# Patient Record
Sex: FEMALE | Race: WHITE | NOT HISPANIC OR LATINO
[De-identification: names, ages, dates, MRNs, and addresses within clinical notes are randomized per-mention and may not be internally consistent; named-entity substitution may affect disease eponyms.]

---

## 2017-08-10 ENCOUNTER — APPOINTMENT (OUTPATIENT)
Dept: CARDIOLOGY | Facility: CLINIC | Age: 77
End: 2017-08-10
Payer: MEDICARE

## 2017-08-10 PROCEDURE — 93000 ELECTROCARDIOGRAM COMPLETE: CPT

## 2017-08-10 PROCEDURE — 99214 OFFICE O/P EST MOD 30 MIN: CPT

## 2017-11-15 ENCOUNTER — RX RENEWAL (OUTPATIENT)
Age: 77
End: 2017-11-15

## 2017-11-15 ENCOUNTER — MEDICATION RENEWAL (OUTPATIENT)
Age: 77
End: 2017-11-15

## 2017-12-13 ENCOUNTER — RECORD ABSTRACTING (OUTPATIENT)
Age: 77
End: 2017-12-13

## 2017-12-18 ENCOUNTER — RX RENEWAL (OUTPATIENT)
Age: 77
End: 2017-12-18

## 2017-12-18 RX ORDER — PRAVASTATIN SODIUM 40 MG/1
40 TABLET ORAL DAILY
Qty: 90 | Refills: 3 | Status: DISCONTINUED | COMMUNITY
End: 2017-12-18

## 2018-01-08 ENCOUNTER — RX RENEWAL (OUTPATIENT)
Age: 78
End: 2018-01-08

## 2018-02-14 ENCOUNTER — APPOINTMENT (OUTPATIENT)
Dept: CARDIOLOGY | Facility: CLINIC | Age: 78
End: 2018-02-14
Payer: MEDICARE

## 2018-02-14 VITALS
HEIGHT: 62 IN | WEIGHT: 132 LBS | SYSTOLIC BLOOD PRESSURE: 120 MMHG | DIASTOLIC BLOOD PRESSURE: 60 MMHG | HEART RATE: 88 BPM | BODY MASS INDEX: 24.29 KG/M2

## 2018-02-14 DIAGNOSIS — M48.00 SPINAL STENOSIS, SITE UNSPECIFIED: ICD-10-CM

## 2018-02-14 DIAGNOSIS — Z78.9 OTHER SPECIFIED HEALTH STATUS: ICD-10-CM

## 2018-02-14 DIAGNOSIS — F32.9 MAJOR DEPRESSIVE DISORDER, SINGLE EPISODE, UNSPECIFIED: ICD-10-CM

## 2018-02-14 DIAGNOSIS — F17.200 NICOTINE DEPENDENCE, UNSPECIFIED, UNCOMPLICATED: ICD-10-CM

## 2018-02-14 PROCEDURE — 99215 OFFICE O/P EST HI 40 MIN: CPT

## 2018-02-14 PROCEDURE — 93306 TTE W/DOPPLER COMPLETE: CPT

## 2018-02-15 PROBLEM — F17.200 CURRENT EVERY DAY SMOKER: Status: ACTIVE | Noted: 2017-12-13

## 2018-02-15 PROBLEM — F32.9 DEPRESSION: Status: ACTIVE | Noted: 2017-12-13

## 2018-02-15 PROBLEM — Z78.9 DRINKS WINE: Status: ACTIVE | Noted: 2018-02-14

## 2018-02-15 PROBLEM — M48.00 SPINAL STENOSIS: Status: ACTIVE | Noted: 2017-12-13

## 2018-08-22 ENCOUNTER — NON-APPOINTMENT (OUTPATIENT)
Age: 78
End: 2018-08-22

## 2018-08-22 ENCOUNTER — APPOINTMENT (OUTPATIENT)
Dept: CARDIOLOGY | Facility: CLINIC | Age: 78
End: 2018-08-22
Payer: MEDICARE

## 2018-08-22 VITALS
HEIGHT: 62 IN | SYSTOLIC BLOOD PRESSURE: 118 MMHG | BODY MASS INDEX: 23.92 KG/M2 | HEART RATE: 91 BPM | WEIGHT: 130 LBS | OXYGEN SATURATION: 98 % | DIASTOLIC BLOOD PRESSURE: 62 MMHG

## 2018-08-22 PROCEDURE — 93000 ELECTROCARDIOGRAM COMPLETE: CPT

## 2018-08-22 PROCEDURE — 99214 OFFICE O/P EST MOD 30 MIN: CPT

## 2018-08-22 RX ORDER — METFORMIN HYDROCHLORIDE 1000 MG/1
1000 TABLET, FILM COATED, EXTENDED RELEASE ORAL
Refills: 0 | Status: DISCONTINUED | COMMUNITY
End: 2018-08-22

## 2019-02-06 ENCOUNTER — APPOINTMENT (OUTPATIENT)
Dept: CARDIOLOGY | Facility: CLINIC | Age: 79
End: 2019-02-06
Payer: MEDICARE

## 2019-02-06 ENCOUNTER — NON-APPOINTMENT (OUTPATIENT)
Age: 79
End: 2019-02-06

## 2019-02-06 VITALS
DIASTOLIC BLOOD PRESSURE: 60 MMHG | WEIGHT: 127 LBS | BODY MASS INDEX: 23.37 KG/M2 | SYSTOLIC BLOOD PRESSURE: 118 MMHG | OXYGEN SATURATION: 95 % | HEART RATE: 90 BPM | HEIGHT: 62 IN

## 2019-02-06 PROCEDURE — 93000 ELECTROCARDIOGRAM COMPLETE: CPT

## 2019-02-06 PROCEDURE — 99214 OFFICE O/P EST MOD 30 MIN: CPT

## 2019-02-06 NOTE — PHYSICAL EXAM
[General Appearance - Well Developed] : well developed [Normal Appearance] : normal appearance [Well Groomed] : well groomed [General Appearance - Well Nourished] : well nourished [No Deformities] : no deformities [General Appearance - In No Acute Distress] : no acute distress [Normal Conjunctiva] : the conjunctiva exhibited no abnormalities [Eyelids - No Xanthelasma] : the eyelids demonstrated no xanthelasmas [Normal Oral Mucosa] : normal oral mucosa [No Oral Pallor] : no oral pallor [No Oral Cyanosis] : no oral cyanosis [JVD Elevated _____cm] : JVD elevated [unfilled] ~U cm above clavicle [Exaggerated Use Of Accessory Muscles For Inspiration] : no accessory muscle use [Heart Rate And Rhythm] : heart rate and rhythm were normal [Heart Sounds] : normal S1 and S2 [Murmurs] : no murmurs present [Edema] : no peripheral edema present [Bowel Sounds] : normal bowel sounds [Abdomen Soft] : soft [Nail Clubbing] : no clubbing of the fingernails [Cyanosis, Localized] : no localized cyanosis [Petechial Hemorrhages (___cm)] : no petechial hemorrhages [] : no ischemic changes [Skin Color & Pigmentation] : normal skin color and pigmentation [Skin Turgor] : normal skin turgor [Oriented To Time, Place, And Person] : oriented to person, place, and time [Impaired Insight] : insight and judgment were intact [Affect] : the affect was normal [Mood] : the mood was normal [No Anxiety] : not feeling anxious [FreeTextEntry1] : Mild kyphosis.  Walks slowly with a cane.

## 2019-02-06 NOTE — REASON FOR VISIT
[Follow-Up - Clinic] : a clinic follow-up of [Coronary Artery Disease] : coronary artery disease [Hyperlipidemia] : hyperlipidemia [Hypertension] : hypertension [FreeTextEntry1] : 78-year-old comes in for preoperative cardiac assessment prior to colonoscopy by Dr. Ayala\par She has limited functional status.\par She uses cane.\par She has no chest pain.\par She is stable dyspnea on exertion.\par She has no PND, orthopnea, palpitation.\par No recent hospital admission for congestive heart failure, acute coronary syndrome or syncope.\par No bleeding complications.\par Her warfarin is being followed by hematologist.\par She is on Plavix for coronary artery disease

## 2019-02-06 NOTE — ASSESSMENT
[FreeTextEntry1] : Reviewed on February 6, 2019\par EKG P. normal sinus rhythm. Right bundle branch block.\par \par Assessment and suggestions\par This is a 78-year-old white female with past medical history as detailed above, \par  with the following active issues:\par Overall stable without any signs of acute coronary syndrome, congestive heart failure or significant cardiac arrhythmias.\par She is stable for colonoscopy with low risk.\par while Plavix is being held for 3-5 days prior to colonoscopy. I would recommend her to take aspirin 81 mg. After colonoscopy. As guided by gastroenterologists, Plavix should be restarted.\par Warfarin should be managed by hematologist.\par Control blood pressure, heart rate pulse oximetry.\par Call for any change in her symptoms\par \par \par Coronary artery disease.  No episodes of angina.  Status post some residual disease following drug-eluting stent to the distal LAD in 2014.  Negative stress test as of November 2017.  Remains on aspirin and statins.\par Dyslipidemia.  She is on pravastatin.  Higher doses or stronger agents not well tolerated.  Dietary measures emphasized.\par Hypertension.  Fairly well controlled.  She is not a candidate for ACE inhibitors or ARBs given intermittent elevated potassium.\par Carotid disease, nonobstructive by MRA.  She is on statins and antiplatelet therapy.  Discussed the importance of smoking cessation.\par Factor V Leiden deficiency.  On long-term anticoagulation as per hematology.  She is on GI prophylaxis as she is also on Plavix.\par Diabetes.  Mildly well controlled.  Hemoglobin A1c goal of less than 7.\par Diminished pulses in both lower extremities.  She is on statins and aspirin.  Smoking cessation reviewed.  No resting pain or claudication.\par Tobacco addiction.  At this time, she remains unable to quit smoking.\par \par Counseling regarding low saturated fat, salt and carbohydrate intake was reviewed. Active lifestyle and regular. Exercise along with weight management is advised.\par All the above were at length reviewed. Answered all the questions. Thank you very much for this kind referral. Please do not hesitate to give me a call for any question.\par Part of this transcription was done with voice recognition software and phonetically similar errors are common. I apologize for that. Please donot hesitate to call for any questions due to above.\par Followup in 6 months\par \par

## 2019-02-06 NOTE — HISTORY OF PRESENT ILLNESS
[FreeTextEntry1] : \par Coronary artery disease.  No symptoms of angina.  Lifestyle is very sedentary because of her spinal stenosis.\par Hypertension.  No episodes of orthostasis.\par Factor V Leiden deficiency.  On Coumadin.  Managed by hematology.  Denies any bleeding complications.\par Peripheral vascular disease.  Denies any CNS events.  The degree of ambulation that she is doing does not provoke any claudication.\par Diabetes.  Followed by Dr. Palmer.\par Tobacco addiction.  Smokes three quarters of a pack a day.\par \par \par

## 2019-03-06 ENCOUNTER — APPOINTMENT (OUTPATIENT)
Dept: CARDIOLOGY | Facility: CLINIC | Age: 79
End: 2019-03-06

## 2019-08-05 ENCOUNTER — APPOINTMENT (OUTPATIENT)
Dept: CARDIOLOGY | Facility: CLINIC | Age: 79
End: 2019-08-05

## 2019-08-06 ENCOUNTER — APPOINTMENT (OUTPATIENT)
Dept: CARDIOLOGY | Facility: CLINIC | Age: 79
End: 2019-08-06
Payer: MEDICARE

## 2019-08-06 VITALS
SYSTOLIC BLOOD PRESSURE: 100 MMHG | OXYGEN SATURATION: 98 % | HEIGHT: 62 IN | BODY MASS INDEX: 18.4 KG/M2 | DIASTOLIC BLOOD PRESSURE: 50 MMHG | WEIGHT: 100 LBS | HEART RATE: 100 BPM

## 2019-08-06 PROCEDURE — 99214 OFFICE O/P EST MOD 30 MIN: CPT

## 2019-08-06 RX ORDER — APIXABAN 2.5 MG/1
2.5 TABLET, FILM COATED ORAL TWICE DAILY
Refills: 0 | Status: ACTIVE | COMMUNITY

## 2019-08-06 RX ORDER — HYDRALAZINE HYDROCHLORIDE 25 MG/1
25 TABLET ORAL TWICE DAILY
Refills: 0 | Status: DISCONTINUED | COMMUNITY
End: 2019-08-06

## 2019-08-06 RX ORDER — HYDRALAZINE HYDROCHLORIDE 25 MG/1
25 TABLET ORAL TWICE DAILY
Qty: 180 | Refills: 3 | Status: DISCONTINUED | COMMUNITY
End: 2019-08-06

## 2019-08-06 RX ORDER — WARFARIN SODIUM 1 MG/1
1 TABLET ORAL DAILY
Refills: 0 | Status: DISCONTINUED | COMMUNITY
End: 2019-08-06

## 2019-08-06 RX ORDER — LEVOTHYROXINE SODIUM 25 UG/1
25 TABLET ORAL DAILY
Refills: 0 | Status: DISCONTINUED | COMMUNITY
End: 2019-08-06

## 2019-08-06 RX ORDER — LIRAGLUTIDE 6 MG/ML
18 INJECTION SUBCUTANEOUS
Refills: 0 | Status: DISCONTINUED | COMMUNITY
End: 2019-08-06

## 2019-08-06 NOTE — PHYSICAL EXAM
[General Appearance - Well Developed] : well developed [Normal Appearance] : normal appearance [Well Groomed] : well groomed [General Appearance - Well Nourished] : well nourished [General Appearance - In No Acute Distress] : no acute distress [No Deformities] : no deformities [Normal Conjunctiva] : the conjunctiva exhibited no abnormalities [Eyelids - No Xanthelasma] : the eyelids demonstrated no xanthelasmas [Normal Oral Mucosa] : normal oral mucosa [No Oral Pallor] : no oral pallor [No Oral Cyanosis] : no oral cyanosis [JVD Elevated _____cm] : JVD elevated [unfilled] ~U cm above clavicle [Exaggerated Use Of Accessory Muscles For Inspiration] : no accessory muscle use [Heart Rate And Rhythm] : heart rate and rhythm were normal [Murmurs] : no murmurs present [Heart Sounds] : normal S1 and S2 [Edema] : no peripheral edema present [Bowel Sounds] : normal bowel sounds [Abdomen Soft] : soft [Nail Clubbing] : no clubbing of the fingernails [FreeTextEntry1] : Mild kyphosis.  Walks slowly with a cane. [Cyanosis, Localized] : no localized cyanosis [Petechial Hemorrhages (___cm)] : no petechial hemorrhages [] : no ischemic changes [Skin Color & Pigmentation] : normal skin color and pigmentation [Skin Turgor] : normal skin turgor [Oriented To Time, Place, And Person] : oriented to person, place, and time [Impaired Insight] : insight and judgment were intact [Affect] : the affect was normal [Mood] : the mood was normal [No Anxiety] : not feeling anxious

## 2019-08-06 NOTE — REASON FOR VISIT
[Follow-Up - Clinic] : a clinic follow-up of [Coronary Artery Disease] : coronary artery disease [Hyperlipidemia] : hyperlipidemia [Hypertension] : hypertension [FreeTextEntry1] : For follow up consultation in presence of recent chemotherapy, significant fatigue, tiredness, and continued limitation of function service.Medical management and follow up with\par She uses cane.\par She has no chest pain.\par She is stable dyspnea on exertion.\par She has no PND, orthopnea, palpitation.\par No recent hospital admission for congestive heart failure, acute coronary syndrome or syncope.\par No bleeding complications.\par Her warfarin was changed to NOAC By hematologist\par She is on Plavix for coronary artery disease

## 2019-11-08 NOTE — ASSESSMENT
MNF faxed to [facility]. Was waiting on pt to return call with particular PT place she wanted, sent MNF with instructions to forward to in network provider local to pt.   [FreeTextEntry1] : Reviewed on February 6, 2019\par EKG P. normal sinus rhythm. Right bundle branch block.\par \par Reviewed on August 6, 2002.\par INR 1.4, white count 14.2, hemoglobin 9.3, platelet count 263, sodium 1:30, potassium 4.4, creatinine 0.83 SGOT PT 34/60\par \par Assessment and suggestions\par This is a 79-year-old white female with past medical history as detailed above, \par  with the following active issues:\par Overall stable without any signs of acute coronary syndrome, congestive heart failure or significant cardiac arrhythmias.\par Low blood pressure. Her chemotherapy via significant fatigue and tiredness. Anemia. Poor prognosis. According to her from her oncological point of view.\par We will discontinue hydralazine at this point, considering lower blood pressure. His blood pressure remains greater than 140/90. Consider restarting hydralazine.\par \par Coronary artery disease.  No episodes of angina.  Status post some residual disease following drug-eluting stent to the distal LAD in 2014.  Negative stress test as of November 2017.  Remains on aspirin and statins.\par Dyslipidemia.  She is on pravastatin.  Higher doses or stronger agents not well tolerated.  Dietary measures emphasized.\par Hypertension.  Fairly well controlled. \par Carotid disease, nonobstructive by MRA.  She is on statins and antiplatelet therapy.  Discussed the importance of smoking cessation.\par Factor V Leiden deficiency.  On long-term anticoagulation as per hematology.  She is on GI prophylaxis as she is also on Plavix.\par Diabetes.  Mildly well controlled.  Hemoglobin A1c goal of less than 7.\par Diminished pulses in both lower extremities.  She is on statins and aspirin.  Smoking cessation reviewed.  No resting pain or claudication.\par Tobacco addiction.  At this time, she remains unable to quit smoking.\par \par Counseling regarding low saturated fat, salt and carbohydrate intake was reviewed. Active lifestyle and regular. Exercise along with weight management is advised.\par All the above were at length reviewed. Answered all the questions. Thank you very much for this kind referral. Please do not hesitate to give me a call for any question.\par Part of this transcription was done with voice recognition software and phonetically similar errors are common. I apologize for that. Please donot hesitate to call for any questions due to above.\par Followup in 6 months\par \par

## 2020-02-10 ENCOUNTER — APPOINTMENT (OUTPATIENT)
Dept: CARDIOLOGY | Facility: CLINIC | Age: 80
End: 2020-02-10
Payer: MEDICARE

## 2020-02-10 ENCOUNTER — NON-APPOINTMENT (OUTPATIENT)
Age: 80
End: 2020-02-10

## 2020-02-10 VITALS
DIASTOLIC BLOOD PRESSURE: 60 MMHG | WEIGHT: 105 LBS | HEART RATE: 84 BPM | HEIGHT: 62 IN | BODY MASS INDEX: 19.32 KG/M2 | SYSTOLIC BLOOD PRESSURE: 146 MMHG | OXYGEN SATURATION: 97 %

## 2020-02-10 PROCEDURE — 99214 OFFICE O/P EST MOD 30 MIN: CPT

## 2020-02-10 PROCEDURE — 93000 ELECTROCARDIOGRAM COMPLETE: CPT

## 2020-02-10 NOTE — ASSESSMENT
[FreeTextEntry1] : ALOK MENDIETA is a 79 year old F who presents today Feb 10, 2020 with the above history and the following active issues:\par \par CAD/VICTOR: Obtain 2d echocardiogram to evaluate resting heart structure, valvular function, and LVEF.\par Obtain a nuclear stress test to evaluate for evidence of myocardial ischemia. The patient will not be able to walk on a treadmill.\par \par HTN: /70 on my exam. Asymptomatic. Keep BP log at home. Low salt diet.\par \par HLD: Lab slip provided for fasting lipid profile and CMP.\par \par Diabetes: Ongoing f/u with PCP.\par \par Factor V Leiden/Colon Ca: Ongoing f/u with heme/onc Dr. Mcfadden.\par \par F/U after testing to review results.\par Discussed red flag symptoms, which would warrant sooner or emergent medical evaluation.\par Any questions and concerns were addressed and resolved.\par \par Sincerely,\par Anny Trinidad FNP-BC\par Patient's history, testing, and plan was reviewed with supervising physician, Dr. Manning

## 2020-02-10 NOTE — HISTORY OF PRESENT ILLNESS
[FreeTextEntry1] : ALOK MENDIETA is a 79 year old female with a past medical history of 3 vessel CAD s/p PCI and CABG, HTN, HLD,Factor V Leiden deficiency, PVD, diabetes, current smoker, colon ca s/p chemo (following with heme onc Dr. Mcfadden).\par \par Last seen 8/6/19.\par \par Uses cane to walk. There is chronic VICTOR. Endorses brief palpitations in the setting of emotional stress. Reports lower extremity swelling after standing for long periods of time. Denies chest pain, dizziness, lightheadedness, syncope, near syncope, PND, orthopnea, and claudication. /80 on my exam. Asymptomatic.\par \par Testing:\par \par EKG 2/10/20 demonstrates SR at 83 bpm, RBBB, nonspecific ST segment changes, WY interval 178 ms, QTc 416 ms\par \par Labs 8/1/19: Cr 0.83, K 4.4, ALT 34, AST 69\par \par Echo 2/14/18: EF 50%. Minimal AR. Mild segmental LVSF with hypokinetic mid and distal septum. Septal motion is abnormal. H/o CABG. Minimal TR.. Minimal WY.\par \par Labs 2/13/18: Hct 37\par \par Nuclear stress testing 9/27/16: No evidence of ischemia by EKG. Abnormal SPECT perfusion scan was ntoed: There is a small sized area of infarction with aleah-infarct ischemia in the apical anterior region. Anterior wall attenuation is noted. This reduces the specificity of the study.

## 2020-02-25 ENCOUNTER — APPOINTMENT (OUTPATIENT)
Dept: CARDIOLOGY | Facility: CLINIC | Age: 80
End: 2020-02-25
Payer: MEDICARE

## 2020-02-25 PROCEDURE — 93306 TTE W/DOPPLER COMPLETE: CPT

## 2020-02-25 PROCEDURE — 78452 HT MUSCLE IMAGE SPECT MULT: CPT

## 2020-02-25 PROCEDURE — 93015 CV STRESS TEST SUPVJ I&R: CPT

## 2020-02-25 PROCEDURE — A9502: CPT

## 2020-03-02 ENCOUNTER — APPOINTMENT (OUTPATIENT)
Dept: CARDIOLOGY | Facility: CLINIC | Age: 80
End: 2020-03-02
Payer: MEDICARE

## 2020-03-02 VITALS
HEART RATE: 83 BPM | WEIGHT: 105 LBS | SYSTOLIC BLOOD PRESSURE: 122 MMHG | DIASTOLIC BLOOD PRESSURE: 60 MMHG | OXYGEN SATURATION: 98 % | HEIGHT: 62 IN | BODY MASS INDEX: 19.32 KG/M2

## 2020-03-02 PROCEDURE — 99214 OFFICE O/P EST MOD 30 MIN: CPT

## 2020-03-02 NOTE — PHYSICAL EXAM
[General Appearance - Well Developed] : well developed [General Appearance - Well Nourished] : well nourished [Well Groomed] : well groomed [Normal Appearance] : normal appearance [General Appearance - In No Acute Distress] : no acute distress [Normal Conjunctiva] : the conjunctiva exhibited no abnormalities [No Deformities] : no deformities [Normal Oral Mucosa] : normal oral mucosa [Eyelids - No Xanthelasma] : the eyelids demonstrated no xanthelasmas [No Oral Cyanosis] : no oral cyanosis [No Oral Pallor] : no oral pallor [Normal Jugular Venous A Waves Present] : normal jugular venous A waves present [No Jugular Venous Guerrero A Waves] : no jugular venous guerrero A waves [Normal Jugular Venous V Waves Present] : normal jugular venous V waves present [Exaggerated Use Of Accessory Muscles For Inspiration] : no accessory muscle use [Respiration, Rhythm And Depth] : normal respiratory rhythm and effort [Heart Rate And Rhythm] : heart rate and rhythm were normal [Auscultation Breath Sounds / Voice Sounds] : lungs were clear to auscultation bilaterally [Heart Sounds] : normal S1 and S2 [Murmurs] : no murmurs present [Abdomen Soft] : soft [Abdomen Tenderness] : non-tender [Gait - Sufficient For Exercise Testing] : the gait was sufficient for exercise testing [Abnormal Walk] : normal gait [Abdomen Mass (___ Cm)] : no abdominal mass palpated [Nail Clubbing] : no clubbing of the fingernails [Cyanosis, Localized] : no localized cyanosis [Petechial Hemorrhages (___cm)] : no petechial hemorrhages [Affect] : the affect was normal [] : no ischemic changes [Oriented To Time, Place, And Person] : oriented to person, place, and time [No Anxiety] : not feeling anxious [Mood] : the mood was normal [FreeTextEntry1] : BL bruit, R>L

## 2020-03-02 NOTE — ASSESSMENT
[FreeTextEntry1] : ALOK MENDIETA is a 79 year old F who presents today Mar 02, 2020 with the above history and the following active issues: \par \par VICTOR: Nuclear stress testing with similar findings from 2016. Smoking cessation again encouraged. Referral to pulmonology given, Dr. Carrion. PFTs ordered. Consider cardiac cath if PFTs normal.\par \par Na 132 on recent b/w, Lab slip provided for repeat BMP.\par \par Carotid Bruit BL auscultated: Obtain carotid ultrasound to evaluate for evidence of significant carotid atherosclerosis or obstruction. \par \par HTN: Well controlled today.\par \par HLD: Recent lipids reviewed and are controlled.\par \par Diabetes: Ongoing f/u with PCP.\par \par Factor V Leiden/Colon Ca: Ongoing f/u with heme/onc Dr. Mcfadden.\par \par F/U in 2 weeks to review results of PFTs, labs, and carotid u/s.\par Discussed red flag symptoms, which would warrant sooner or emergent medical evaluation.\par Any questions and concerns were addressed and resolved.\par \par \par Sincerely,\par Anny Trinidad FNP-BC\par Patient's history, testing, and plan was reviewed with supervising physician, Dr. Newton Manning

## 2020-03-02 NOTE — HISTORY OF PRESENT ILLNESS
[FreeTextEntry1] : ALOK MENDIETA is a 79 year old female with a past medical history of 3 vessel CAD s/p PCI and CABG, HTN, HLD,Factor V Leiden deficiency, PVD, diabetes, current smoker, colon ca s/p chemo (following with heme onc Dr. Mcfadden).\par \par \par \par Last seen 2/10/20 - reported symptoms of VICTOR, palpitations, and edema. Echo, Lexiscan nuke stress test, and labs ordered. Presents today to review results. She reports edema has improved. Still has VICTOR and palpitations last up to 1 minute. She denies chest pain, pressure, orthopnea, LE edema, lightheadedness, dizziness, near syncope or syncope. \par \par \par Testing:\par \par \par Echo 2/25/20: EF 60%. Mild MR, Minimal AR. Normal wall motion. Mod DD\par \par Lexiscan nuclear stress test 2/25/20: No evidence of ischemia by EKG. Mild to mod defect in apical wall that is partially reversible suggestive of infarction with minimal per infarct ischemia. Attenuation artifact noted.\par \par Labs 2/24/20: Na 132, K 4.3, Gluc 229, Cr 0.79, AST 13, ALT 12, Trigs 90, HDl 51, LDL 60, Chol 129\par \par EKG 2/10/20 demonstrates SR at 83 bpm, RBBB, nonspecific ST segment changes, IN interval 178 ms, QTc 416 ms\par \par Labs 8/1/19: Cr 0.83, K 4.4, ALT 34, AST 69\par \par Echo 2/14/18: EF 50%. Minimal AR. Mild segmental LVSF with hypokinetic mid and distal septum. Septal motion is abnormal. H/o CABG. Minimal TR.. Minimal IN.\par \par Labs 2/13/18: Hct 37\par \par Nuclear stress testing 9/27/16: No evidence of ischemia by EKG. Abnormal SPECT perfusion scan was ntoed: There is a small sized area of infarction with aleah-infarct ischemia in the apical anterior region. Anterior wall attenuation is noted. This reduces the specificity of the study.

## 2020-07-07 ENCOUNTER — APPOINTMENT (OUTPATIENT)
Dept: CARDIOLOGY | Facility: CLINIC | Age: 80
End: 2020-07-07
Payer: MEDICARE

## 2020-07-07 PROCEDURE — 93880 EXTRACRANIAL BILAT STUDY: CPT

## 2020-07-16 ENCOUNTER — APPOINTMENT (OUTPATIENT)
Dept: CARDIOLOGY | Facility: CLINIC | Age: 80
End: 2020-07-16
Payer: MEDICARE

## 2020-07-16 VITALS
HEART RATE: 91 BPM | HEIGHT: 62 IN | SYSTOLIC BLOOD PRESSURE: 138 MMHG | DIASTOLIC BLOOD PRESSURE: 62 MMHG | WEIGHT: 107 LBS | OXYGEN SATURATION: 99 % | BODY MASS INDEX: 19.69 KG/M2

## 2020-07-16 PROCEDURE — 99214 OFFICE O/P EST MOD 30 MIN: CPT

## 2020-07-16 RX ORDER — METFORMIN HYDROCHLORIDE 1000 MG/1
1000 TABLET, COATED ORAL TWICE DAILY
Refills: 0 | Status: DISCONTINUED | COMMUNITY
End: 2020-07-16

## 2020-07-16 RX ORDER — FAMOTIDINE 20 MG/1
20 TABLET, FILM COATED ORAL DAILY
Qty: 30 | Refills: 0 | Status: DISCONTINUED | COMMUNITY
End: 2020-07-16

## 2020-07-16 RX ORDER — UBIDECARENONE 100 MG
100 CAPSULE ORAL
Refills: 0 | Status: DISCONTINUED | COMMUNITY
End: 2020-07-16

## 2020-07-16 NOTE — REVIEW OF SYSTEMS
[Dyspnea on exertion] : dyspnea during exertion [Negative] : Endocrine [Palpitations] : no palpitations

## 2020-07-16 NOTE — ASSESSMENT
[FreeTextEntry1] : ALOK MENDIETA is a 80 year old F who presents today Jul 16, 2020 with the above history and the following active issues: \par \par VICTOR: Recommend f/u with pulm Dr. Carrion. PFTs previously ordered. Consider cardiac cath if PFTs normal.\par \par Na 132 on recent b/w, labs requested from Dr. Portillo done this past Tuesday.\par \par Carotid atherosclerosis: Continue Plavix and Pravastatin.\par \par HTN: 148/70 today. Recommend increasing Hydralazine to 50mg PO BID.\par \par HLD: Recent lipids reviewed and are controlled.\par \par Diabetes: Ongoing f/u with PCP.\par \par Factor V Leiden/Colon Ca: Ongoing f/u with heme/onc Dr. Mcfadden.\par \par F/U in 2 months.\par Discussed red flag symptoms, which would warrant sooner or emergent medical evaluation.\par Any questions and concerns were addressed and resolved.\par \par \par Sincerely,\par Anny Trinidad FN-BC\par Patient's history, testing, and plan was reviewed with supervising physician, Dr. Newton Manning

## 2020-07-16 NOTE — HISTORY OF PRESENT ILLNESS
[FreeTextEntry1] : ALOK MENDIETA is a 80 year old female with a past medical history of 3 vessel CAD s/p PCI and CABG, HTN, HLD,Factor V Leiden deficiency, PVD, diabetes, current smoker, colon ca s/p chemo (following with heme onc Dr. Mcfadden).\par \par \par \par Last seen 3/2/20. In the interim there have been no hospitalizations or procedures. Presents today 7/16/20 to review the results of her carotid u/s. At last visit 3/2/20, it was recommended she follow up with pulm for PFTs, but she has not done so because of COVID pandemic. There is chronic VICTOR. She denies chest pain, pressure, palpitations, orthopnea, LE edema, lightheadedness, dizziness, near syncope or syncope. She is not on a formal exercise regimen. Unfortunately still smoking. \par \par \par Testing:\par \par Carotids 7/7/20: RCCA and LCCA show moderate atherosclerotic dz without obstruction. CHARLY and LICA <50% stenosis. Moderate to severe disease in the left bulb. LECA velocities elevated elevated suggestive of narrowing. Moderate disease i the right bulb. \par \par \par Echo 2/25/20: EF 60%. Mild MR, Minimal AR. Normal wall motion. Mod DD\par \par Lexiscan nuclear stress test 2/25/20: No evidence of ischemia by EKG. Mild to mod defect in apical wall that is partially reversible suggestive of infarction with minimal per infarct ischemia. Attenuation artifact noted.\par \par Labs 2/24/20: Na 132, K 4.3, Gluc 229, Cr 0.79, AST 13, ALT 12, Trigs 90, HDl 51, LDL 60, Chol 129\par \par EKG 2/10/20 demonstrates SR at 83 bpm, RBBB, nonspecific ST segment changes, GA interval 178 ms, QTc 416 ms\par \par Labs 8/1/19: Cr 0.83, K 4.4, ALT 34, AST 69\par \par Echo 2/14/18: EF 50%. Minimal AR. Mild segmental LVSF with hypokinetic mid and distal septum. Septal motion is abnormal. H/o CABG. Minimal TR.. Minimal GA.\par \par Labs 2/13/18: Hct 37\par \par Nuclear stress testing 9/27/16: No evidence of ischemia by EKG. Abnormal SPECT perfusion scan was ntoed: There is a small sized area of infarction with aleah-infarct ischemia in the apical anterior region. Anterior wall attenuation is noted. This reduces the specificity of the study.

## 2020-07-16 NOTE — PHYSICAL EXAM
[General Appearance - Well Developed] : well developed [Normal Appearance] : normal appearance [Well Groomed] : well groomed [General Appearance - Well Nourished] : well nourished [No Deformities] : no deformities [General Appearance - In No Acute Distress] : no acute distress [Normal Conjunctiva] : the conjunctiva exhibited no abnormalities [Eyelids - No Xanthelasma] : the eyelids demonstrated no xanthelasmas [Normal Oral Mucosa] : normal oral mucosa [No Oral Pallor] : no oral pallor [No Oral Cyanosis] : no oral cyanosis [Normal Jugular Venous A Waves Present] : normal jugular venous A waves present [Normal Jugular Venous V Waves Present] : normal jugular venous V waves present [No Jugular Venous Guerrero A Waves] : no jugular venous guerrero A waves [Respiration, Rhythm And Depth] : normal respiratory rhythm and effort [Exaggerated Use Of Accessory Muscles For Inspiration] : no accessory muscle use [Auscultation Breath Sounds / Voice Sounds] : lungs were clear to auscultation bilaterally [Heart Rate And Rhythm] : heart rate and rhythm were normal [Heart Sounds] : normal S1 and S2 [Murmurs] : no murmurs present [Abdomen Soft] : soft [Abdomen Tenderness] : non-tender [Abdomen Mass (___ Cm)] : no abdominal mass palpated [Abnormal Walk] : normal gait [Gait - Sufficient For Exercise Testing] : the gait was sufficient for exercise testing [Nail Clubbing] : no clubbing of the fingernails [Cyanosis, Localized] : no localized cyanosis [Petechial Hemorrhages (___cm)] : no petechial hemorrhages [] : no ischemic changes [Oriented To Time, Place, And Person] : oriented to person, place, and time [Affect] : the affect was normal [Mood] : the mood was normal [No Anxiety] : not feeling anxious [FreeTextEntry1] : BL bruit, R>L

## 2020-09-29 ENCOUNTER — APPOINTMENT (OUTPATIENT)
Dept: CARDIOLOGY | Facility: CLINIC | Age: 80
End: 2020-09-29
Payer: MEDICARE

## 2020-09-29 VITALS
BODY MASS INDEX: 20.24 KG/M2 | WEIGHT: 110 LBS | HEIGHT: 62 IN | OXYGEN SATURATION: 99 % | DIASTOLIC BLOOD PRESSURE: 62 MMHG | HEART RATE: 92 BPM | SYSTOLIC BLOOD PRESSURE: 102 MMHG

## 2020-09-29 PROCEDURE — 99214 OFFICE O/P EST MOD 30 MIN: CPT

## 2020-09-29 NOTE — PHYSICAL EXAM
[General Appearance - Well Developed] : well developed [Normal Appearance] : normal appearance [Well Groomed] : well groomed [General Appearance - Well Nourished] : well nourished [No Deformities] : no deformities [General Appearance - In No Acute Distress] : no acute distress [Normal Conjunctiva] : the conjunctiva exhibited no abnormalities [Eyelids - No Xanthelasma] : the eyelids demonstrated no xanthelasmas [Normal Jugular Venous A Waves Present] : normal jugular venous A waves present [Normal Jugular Venous V Waves Present] : normal jugular venous V waves present [No Jugular Venous Guerrero A Waves] : no jugular venous guerrero A waves [Respiration, Rhythm And Depth] : normal respiratory rhythm and effort [Exaggerated Use Of Accessory Muscles For Inspiration] : no accessory muscle use [Auscultation Breath Sounds / Voice Sounds] : lungs were clear to auscultation bilaterally [Heart Rate And Rhythm] : heart rate and rhythm were normal [Heart Sounds] : normal S1 and S2 [Murmurs] : no murmurs present [Abdomen Soft] : soft [Abdomen Tenderness] : non-tender [Abdomen Mass (___ Cm)] : no abdominal mass palpated [Nail Clubbing] : no clubbing of the fingernails [Cyanosis, Localized] : no localized cyanosis [Petechial Hemorrhages (___cm)] : no petechial hemorrhages [] : no ischemic changes [Oriented To Time, Place, And Person] : oriented to person, place, and time [Affect] : the affect was normal [Mood] : the mood was normal [No Anxiety] : not feeling anxious [FreeTextEntry1] : Walking with a cane

## 2020-09-29 NOTE — ASSESSMENT
[FreeTextEntry1] : past tests for reference:\par Testing:\par \par Carotids 7/7/20: RCCA and LCCA show moderate atherosclerotic dz without obstruction. CHARLY and LICA <50% stenosis. Moderate to severe disease in the left bulb. LECA velocities elevated elevated suggestive of narrowing. Moderate disease i the right bulb. \par \par \par Echo 2/25/20: EF 60%. Mild MR, Minimal AR. Normal wall motion. Mod DD\par \par Lexiscan nuclear stress test 2/25/20: No evidence of ischemia by EKG. Mild to mod defect in apical wall that is partially reversible suggestive of infarction with minimal per infarct ischemia. Attenuation artifact noted.\par \par Labs 2/24/20: Na 132, K 4.3, Gluc 229, Cr 0.79, AST 13, ALT 12, Trigs 90, HDl 51, LDL 60, Chol 129\par \par EKG 2/10/20 demonstrates SR at 83 bpm, RBBB, nonspecific ST segment changes, IL interval 178 ms, QTc 416 ms\par \par Labs 8/1/19: Cr 0.83, K 4.4, ALT 34, AST 69\par \par Echo 2/14/18: EF 50%. Minimal AR. Mild segmental LVSF with hypokinetic mid and distal septum. Septal motion is abnormal. H/o CABG. Minimal TR.. Minimal IL.\par \par Labs 2/13/18: Hct 37\par \par Nuclear stress testing 9/27/16: No evidence of ischemia by EKG. Abnormal SPECT perfusion scan was ntoed: There is a small sized area of infarction with aleah-infarct ischemia in the apical anterior region. Anterior wall attenuation is noted. This reduces the specificity of the study. \par \par \par

## 2020-09-29 NOTE — REASON FOR VISIT
[Follow-Up - Clinic] : a clinic follow-up of [Coronary Artery Disease] : coronary artery disease [Hyperlipidemia] : hyperlipidemia [Hypertension] : hypertension [FreeTextEntry1] : 80-year-old female was seen in the office with a family member.\par Going for cataract surgery.\par Since last seen she did not have pulmonary evaluation as recommended.\par She has stable exertional dyspnea.  There is no chest pain.\par She has continued smoking\par She denies any PND orthopnea pedal edema\par She denies any claudication pain\par She walks with a cane.\par No recent fall, no near syncopal or syncopal event\par No bleeding complications.\par She has given recommendations regarding her Eliquis prior to surgery as per  who manages the anticoagulation for her.\par

## 2020-09-29 NOTE — HISTORY OF PRESENT ILLNESS
[FreeTextEntry1] :  past medical history of 3 vessel CAD s/p PCI and CABG, HTN, HLD,Factor V Leiden deficiency, PVD, diabetes, current smoker, colon ca s/p chemo (following with heme onc Dr. Mcfadden).\par \par \par

## 2020-09-29 NOTE — DISCUSSION/SUMMARY
[FreeTextEntry1] : ALOK MENDIETA is a 80 year old F who presents today  with the above history and the following active issues: \par \par VICTOR: Recommend f/u with pulm Dr. Carrion. PFTs previously ordered. Consider cardiac cath if PFTs normal.\par \par Carotid atherosclerosis: Continue Plavix and Pravastatin.\par \par HTN: 148/70 today. Recommend increasing Hydralazine to 50mg PO BID.\par \par HLD: Recent lipids reviewed and are controlled.\par \par Diabetes: Ongoing f/u with PCP.\par \par Factor V Leiden/Colon Ca: Ongoing f/u with heme/onc Dr. Mcfadden.  Continue anticoagulation as per hematologist\par \par Overall stable from cardiac point of view for cataract surgery\par Eliquis hold as recommended by hematologist\par If need to hold clopidogrel hold it for 3 to 5 days if it is held prior to surgery please restart it as soon as possible considering known history of coronary artery disease.\par \par Counseling regarding low saturated fat, salt and carbohydrate intake was reviewed. Active lifestyle and regular. Exercise along with weight management is advised.\par All the above were at length reviewed. Answered all the questions. Thank you very much for this kind referral. Please do not hesitate to give me a call for any question.\par Part of this transcription was done with voice recognition software and phonetically similar errors are common. I apologize for that. Please do not hesitate to call for any questions due to above.\par \par \par

## 2020-09-29 NOTE — REVIEW OF SYSTEMS
[Dyspnea on exertion] : dyspnea during exertion [Negative] : Heme/Lymph [Palpitations] : no palpitations [Joint Pain] : joint pain [Joint Stiffness] : joint stiffness

## 2020-11-06 DIAGNOSIS — R79.89 OTHER SPECIFIED ABNORMAL FINDINGS OF BLOOD CHEMISTRY: ICD-10-CM

## 2021-09-28 ENCOUNTER — APPOINTMENT (OUTPATIENT)
Dept: CARDIOLOGY | Facility: CLINIC | Age: 81
End: 2021-09-28
Payer: MEDICARE

## 2021-09-28 ENCOUNTER — NON-APPOINTMENT (OUTPATIENT)
Age: 81
End: 2021-09-28

## 2021-09-28 VITALS
OXYGEN SATURATION: 99 % | SYSTOLIC BLOOD PRESSURE: 134 MMHG | DIASTOLIC BLOOD PRESSURE: 60 MMHG | WEIGHT: 96 LBS | HEART RATE: 81 BPM | HEIGHT: 62 IN | BODY MASS INDEX: 17.66 KG/M2

## 2021-09-28 PROCEDURE — 93000 ELECTROCARDIOGRAM COMPLETE: CPT

## 2021-09-28 PROCEDURE — 99406 BEHAV CHNG SMOKING 3-10 MIN: CPT

## 2021-09-28 PROCEDURE — 99214 OFFICE O/P EST MOD 30 MIN: CPT | Mod: 25

## 2021-09-28 PROCEDURE — 99215 OFFICE O/P EST HI 40 MIN: CPT

## 2021-09-28 RX ORDER — OMEPRAZOLE 20 MG/1
20 TABLET, DELAYED RELEASE ORAL
Refills: 0 | Status: ACTIVE | COMMUNITY

## 2021-09-28 RX ORDER — ACETAMINOPHEN 500 MG/1
500 TABLET, COATED ORAL
Refills: 0 | Status: ACTIVE | COMMUNITY

## 2021-09-28 RX ORDER — LINAGLIPTIN AND METFORMIN HYDROCHLORIDE 2.5; 1 MG/1; MG/1
2.5-1 TABLET, FILM COATED ORAL TWICE DAILY
Refills: 0 | Status: ACTIVE | COMMUNITY
Start: 2020-09-25

## 2021-09-28 NOTE — PHYSICAL EXAM
[Well Nourished] : well nourished [No Acute Distress] : no acute distress [Normal Conjunctiva] : normal conjunctiva [Normal Venous Pressure] : normal venous pressure [Normal S1, S2] : normal S1, S2 [No Rub] : no rub [No Gallop] : no gallop [Clear Lung Fields] : clear lung fields [Good Air Entry] : good air entry [No Respiratory Distress] : no respiratory distress  [Soft] : abdomen soft [Non Tender] : non-tender [No Masses/organomegaly] : no masses/organomegaly [Normal Bowel Sounds] : normal bowel sounds [No Edema] : no edema [No Cyanosis] : no cyanosis [No Clubbing] : no clubbing [No Rash] : no rash [No Skin Lesions] : no skin lesions [Moves all extremities] : moves all extremities [No Focal Deficits] : no focal deficits [Normal Speech] : normal speech [Alert and Oriented] : alert and oriented [Normal memory] : normal memory [Frail] : frail [No Varicosities] : no varicosities [de-identified] : Left carotid bruit [de-identified] : Systolic ejection murmur 1-2 over 6. [de-identified] : Walks with a cane

## 2021-09-28 NOTE — REVIEW OF SYSTEMS
[Negative] : Heme/Lymph [SOB] : shortness of breath [Dyspnea on exertion] : dyspnea during exertion [Joint Pain] : joint pain [Joint Stiffness] : joint stiffness

## 2021-09-28 NOTE — ASSESSMENT
[FreeTextEntry1] : past tests for reference:\par Testing:\par \par Carotids 7/7/20: RCCA and LCCA show moderate atherosclerotic dz without obstruction. CHARLY and LICA <50% stenosis. Moderate to severe disease in the left bulb. LECA velocities elevated elevated suggestive of narrowing. Moderate disease i the right bulb. \par \par \par Echo 2/25/20: EF 60%. Mild MR, Minimal AR. Normal wall motion. Mod DD\par \par Lexiscan nuclear stress test 2/25/20: No evidence of ischemia by EKG. Mild to mod defect in apical wall that is partially reversible suggestive of infarction with minimal per infarct ischemia. Attenuation artifact noted.\par \par Labs 2/24/20: Na 132, K 4.3, Gluc 229, Cr 0.79, AST 13, ALT 12, Trigs 90, HDl 51, LDL 60, Chol 129\par \par EKG 2/10/20 demonstrates SR at 83 bpm, RBBB, nonspecific ST segment changes, NE interval 178 ms, QTc 416 ms\par \par Labs 8/1/19: Cr 0.83, K 4.4, ALT 34, AST 69\par \par Echo 2/14/18: EF 50%. Minimal AR. Mild segmental LVSF with hypokinetic mid and distal septum. Septal motion is abnormal. H/o CABG. Minimal TR.. Minimal NE.\par \par Labs 2/13/18: Hct 37\par \par Nuclear stress testing 9/27/16: No evidence of ischemia by EKG. Abnormal SPECT perfusion scan was ntoed: There is a small sized area of infarction with aleah-infarct ischemia in the apical anterior region. Anterior wall attenuation is noted. This reduces the specificity of the study. \par \par Reviewed September 28, 2021.  Globin A1c 5.6.\par No other labs available.\par EKG.  Normal sinus rhythm.\par Echocardiogram February 25, 2020.  Mild mitral regurgitation mild mitral stenosis PASP 23.  Normal left atrium.  Moderate diastolic dysfunction.\par Bilateral carotid Doppler study July 7, 2020.  Moderate heterogeneous plaque bilaterally with less than 50% stenosis.\par

## 2021-09-28 NOTE — DISCUSSION/SUMMARY
[FreeTextEntry1] : ALOK MENDIETA is a 81 year old F who presents today  with the above history and the following active issues: \par \par Dyspnea on exertion.  Nonrheumatic mitral regurgitation and mitral stenosis.  Stable.  No signs of CHF.  Echocardiogram will be followed for nonrheumatic valvular heart disease.  LV ejection fraction LV wall motion.\par \par Carotid atherosclerosis: Continue Plavix and Pravastatin.\par \par HTN: Much better controlled.  Continue present regimen medication.\par \par HLD: Fasting lipid panel ordered.  Continue with pravastatin.\par \par Diabetes: Ongoing f/u with PCP.  Very well controlled hemoglobin A1c.\par \par Factor V Leiden/Colon Ca: Ongoing f/u with heme/onc Dr. Mcfadden.  Continue anticoagulation as per hematologist\par \par Counseling regarding low saturated fat, salt and carbohydrate intake was reviewed. Active lifestyle and regular. Exercise along with weight management is advised.\par All the above were at length reviewed. Answered all the questions. Thank you very much for this kind referral. Please do not hesitate to give me a call for any question.\par Part of this transcription was done with voice recognition software and phonetically similar errors are common. I apologize for that. Please do not hesitate to call for any questions due to above.\par \par Follow-up otherwise in 6 months or for any change in her symptoms.\par

## 2021-10-15 ENCOUNTER — APPOINTMENT (OUTPATIENT)
Dept: CARDIOLOGY | Facility: CLINIC | Age: 81
End: 2021-10-15
Payer: MEDICARE

## 2021-10-15 PROCEDURE — 93880 EXTRACRANIAL BILAT STUDY: CPT

## 2021-10-15 PROCEDURE — 93306 TTE W/DOPPLER COMPLETE: CPT

## 2021-10-18 ENCOUNTER — APPOINTMENT (OUTPATIENT)
Dept: CARDIOLOGY | Facility: CLINIC | Age: 81
End: 2021-10-18
Payer: MEDICARE

## 2021-10-18 PROCEDURE — 99442: CPT | Mod: 95

## 2021-10-18 NOTE — REVIEW OF SYSTEMS
[SOB] : shortness of breath [Dyspnea on exertion] : dyspnea during exertion [Joint Pain] : joint pain [Joint Stiffness] : joint stiffness [Negative] : Heme/Lymph

## 2021-10-18 NOTE — REASON FOR VISIT
[Symptom and Test Evaluation] : symptom and test evaluation [Structural Heart and Valve Disease] : structural heart and valve disease [Hyperlipidemia] : hyperlipidemia [Hypertension] : hypertension [Carotid, Aortic and Peripheral Vascular Disease] : carotid, aortic and peripheral vascular disease [Family Member] : family member [FreeTextEntry3] : Dr. London [FreeTextEntry1] : \par 81-year-old female had a telephone visit today.  Reviewed echocardiogram carotid Doppler study.\par \par Consent: Patient consented to telephone visit.  She is at home.  I am in [facility] office.\par Time: A total of 15 minutes was spent in review of pertinent medical records, discussion with the patient, evaluation of patient problem, and coordination of a care plan as part of this telephone visit.\par \par No physical examination was performed at this visit for performed virtually with exceptions as noted below.\par \par Physical examination was not performed as a  the risk of COVID-19 cross-contamination to be greater than the benefit to the patient conferred by the physical examination.\par \par Since last seen she did not have pulmonary evaluation as recommended.\par She has stable exertional dyspnea.  There is no chest pain.\par She has continued smoking\par She denies any PND orthopnea pedal edema\par She denies any claudication pain\par She walks with a cane.\par No recent fall, no near syncopal or syncopal event\par No bleeding complications.\par \par

## 2021-10-18 NOTE — DISCUSSION/SUMMARY
[FreeTextEntry1] : ALOK MENDIETA is a 81 year old F who presents today  with the above history and the following active issues: \par \par Dyspnea on exertion.  Nonrheumatic mitral regurgitation and mitral stenosis.  Stable.  No signs of CHF.  Echocardiogram reviewed.  Considering the reduction in LV systolic function and known history of coronary artery disease recommended further ischemic evaluation with pharmacological myocardial perfusion scan.  Pulmonary evaluation again reviewed.\par \par Carotid atherosclerosis: Progression noted.  LDL cholesterol less than 70.  Continue Plavix and Pravastatin.  Smoking cessation strongly recommended.\par \par HTN:   Continue present regimen medication.\par \par HLD: Fasting lipid panel ordered.  Continue with pravastatin.\par \par Diabetes: Ongoing f/u with PCP.  Very well controlled hemoglobin A1c in the past.\par \par Factor V Leiden/Colon Ca: Ongoing f/u with heme/onc Dr. Mcfadden.  Continue anticoagulation as per hematologist\par \par Counseling regarding low saturated fat, salt and carbohydrate intake was reviewed. Active lifestyle and regular. Exercise along with weight management is advised.\par All the above were at length reviewed. Answered all the questions. Thank you very much for this kind referral. Please do not hesitate to give me a call for any question.\par Part of this transcription was done with voice recognition software and phonetically similar errors are common. I apologize for that. Please do not hesitate to call for any questions due to above.\par \par Follow-up after nuclear marker perfusion scan to see whether she needs any further testing.\par

## 2021-10-18 NOTE — COUNSELING
[Risk of tobacco use and health benefits of smoking cessation discussed] : Risk of tobacco use and health benefits of smoking cessation discussed [Cessation strategies including cessation program discussed] : Cessation strategies including cessation program discussed [Use of nicotine replacement therapies and other medications discussed] : Use of nicotine replacement therapies and other medications discussed [Encouraged to pick a quit date and identify support needed to quit] : Encouraged to pick a quit date and identify support needed to quit [Willing to Quit Smoking] : Not willing to quit smoking [FreeTextEntry1] : 3

## 2021-10-18 NOTE — CARDIOLOGY SUMMARY
[de-identified] : September 28, 2021.  Normal sinus rhythm. [de-identified] : October 15, 2021.  LV ejection fraction 50%.  Mild mitral stenosis noted. [de-identified] : Bilateral carotid Doppler study.  October 15, 2021.  Bilateral 50 to 69% stenosis.  Moderate atherosclerosis.  Progressive worsening noted compared to last year.

## 2021-11-23 ENCOUNTER — APPOINTMENT (OUTPATIENT)
Dept: CARDIOLOGY | Facility: CLINIC | Age: 81
End: 2021-11-23
Payer: MEDICARE

## 2021-11-23 PROCEDURE — 78452 HT MUSCLE IMAGE SPECT MULT: CPT

## 2021-11-23 PROCEDURE — A9502: CPT

## 2021-11-23 PROCEDURE — 93015 CV STRESS TEST SUPVJ I&R: CPT

## 2021-11-30 ENCOUNTER — NON-APPOINTMENT (OUTPATIENT)
Age: 81
End: 2021-11-30

## 2021-11-30 ENCOUNTER — APPOINTMENT (OUTPATIENT)
Dept: CARDIOLOGY | Facility: CLINIC | Age: 81
End: 2021-11-30
Payer: MEDICARE

## 2021-11-30 VITALS
WEIGHT: 102 LBS | DIASTOLIC BLOOD PRESSURE: 60 MMHG | HEART RATE: 97 BPM | SYSTOLIC BLOOD PRESSURE: 138 MMHG | OXYGEN SATURATION: 97 % | HEIGHT: 62 IN | BODY MASS INDEX: 18.77 KG/M2

## 2021-11-30 PROCEDURE — 99406 BEHAV CHNG SMOKING 3-10 MIN: CPT

## 2021-11-30 PROCEDURE — 99215 OFFICE O/P EST HI 40 MIN: CPT

## 2021-11-30 RX ORDER — PRAVASTATIN SODIUM 40 MG/1
40 TABLET ORAL
Qty: 90 | Refills: 0 | Status: DISCONTINUED | COMMUNITY
Start: 2017-11-15 | End: 2021-11-30

## 2021-11-30 NOTE — PHYSICAL EXAM
[Well Nourished] : well nourished [No Acute Distress] : no acute distress [Frail] : frail [Normal Conjunctiva] : normal conjunctiva [Normal Venous Pressure] : normal venous pressure [No Carotid Bruit] : no carotid bruit [Normal S1, S2] : normal S1, S2 [No Rub] : no rub [No Gallop] : no gallop [No Respiratory Distress] : no respiratory distress  [Wheeze ____] : wheeze [unfilled] [Soft] : abdomen soft [Non Tender] : non-tender [No Masses/organomegaly] : no masses/organomegaly [Normal Bowel Sounds] : normal bowel sounds [Normal Gait] : normal gait [No Edema] : no edema [No Cyanosis] : no cyanosis [No Clubbing] : no clubbing [No Varicosities] : no varicosities [No Rash] : no rash [No Skin Lesions] : no skin lesions [Moves all extremities] : moves all extremities [No Focal Deficits] : no focal deficits [Normal Speech] : normal speech [Alert and Oriented] : alert and oriented [Normal memory] : normal memory [de-identified] : Decreased carotid upstroke [de-identified] : Systolic ejection murmur 2/6 [de-identified] : Palpable bilateral distal pulses

## 2021-11-30 NOTE — REVIEW OF SYSTEMS
[SOB] : shortness of breath [Dyspnea on exertion] : dyspnea during exertion [Joint Pain] : joint pain [Joint Stiffness] : joint stiffness [Negative] : Heme/Lymph [Chest Discomfort] : no chest discomfort [Lower Ext Edema] : no extremity edema [Leg Claudication] : no intermittent leg claudication [Palpitations] : palpitations [Orthopnea] : no orthopnea [PND] : no PND [Syncope] : no syncope [Cough] : cough [Wheezing] : wheezing [Coughing Up Blood] : no hemoptysis [Snoring] : no snoring [Myalgia] : myalgia

## 2021-11-30 NOTE — HISTORY OF PRESENT ILLNESS
[FreeTextEntry1] :  past medical history of 3 vessel CAD s/p PCI and CABG, HTN, HLD,Factor V Leiden deficiency, PVD both lower extremity and carotids, diabetes, current smoker, colon ca s/p chemo (following with heme onc Dr. Mcfadden).\par \par \par

## 2021-11-30 NOTE — COUNSELING
[Risk of tobacco use and health benefits of smoking cessation discussed] : Risk of tobacco use and health benefits of smoking cessation discussed [Cessation strategies including cessation program discussed] : Cessation strategies including cessation program discussed [Use of nicotine replacement therapies and other medications discussed] : Use of nicotine replacement therapies and other medications discussed [Willing to Quit Smoking] : Not willing to quit smoking [Tobacco Use Cessation Intermediate Greater Than 3 Minutes Up to 10 Minutes] : Tobacco Use Cessation Intermediate Greater Than 3 Minutes Up to 10 Minutes [FreeTextEntry1] : 3

## 2021-11-30 NOTE — ASSESSMENT
[FreeTextEntry1] : past tests for reference:\par Testing:\par \par Carotids 7/7/20: RCCA and LCCA show moderate atherosclerotic dz without obstruction. CHARLY and LICA <50% stenosis. Moderate to severe disease in the left bulb. LECA velocities elevated elevated suggestive of narrowing. Moderate disease i the right bulb. \par \par \par Echo 2/25/20: EF 60%. Mild MR, Minimal AR. Normal wall motion. Mod DD\par \par Lexiscan nuclear stress test 2/25/20: No evidence of ischemia by EKG. Mild to mod defect in apical wall that is partially reversible suggestive of infarction with minimal per infarct ischemia. Attenuation artifact noted.\par \par Labs 2/24/20: Na 132, K 4.3, Gluc 229, Cr 0.79, AST 13, ALT 12, Trigs 90, HDl 51, LDL 60, Chol 129\par \par EKG 2/10/20 demonstrates SR at 83 bpm, RBBB, nonspecific ST segment changes, HI interval 178 ms, QTc 416 ms\par \par Labs 8/1/19: Cr 0.83, K 4.4, ALT 34, AST 69\par \par Echo 2/14/18: EF 50%. Minimal AR. Mild segmental LVSF with hypokinetic mid and distal septum. Septal motion is abnormal. H/o CABG. Minimal TR.. Minimal HI.\par \par Labs 2/13/18: Hct 37\par \par Nuclear stress testing 9/27/16: No evidence of ischemia by EKG. Abnormal SPECT perfusion scan was ntoed: There is a small sized area of infarction with aleah-infarct ischemia in the apical anterior region. Anterior wall attenuation is noted. This reduces the specificity of the study. \par \par Reviewed September 28, 2021.  Globin A1c 5.6.\par No other labs available.\par EKG.  Normal sinus rhythm.\par Echocardiogram February 25, 2020.  Mild mitral regurgitation mild mitral stenosis PASP 23.  Normal left atrium.  Moderate diastolic dysfunction.\par Bilateral carotid Doppler study July 7, 2020.  Moderate heterogeneous plaque bilaterally with less than 50% stenosis.\par \par Reviewed on October 15, 2021.\par Labs from December 7, 2020 had shown LDL cholesterol of 46 HDL 54 triglycerides 64 total cholesterol 113 TSH 4.19 creatinine 0.86.\par Echocardiogram carotid Doppler study as noted above\par \par Reviewed on November 30, 2021.\par Labs from September 22, 2021, carotid Doppler study, echocardiogram, nuclear marker perfusion scan reviewed.  As noted above.

## 2021-11-30 NOTE — CARDIOLOGY SUMMARY
[de-identified] : September 28, 2021.  Normal sinus rhythm. [de-identified] : Nuclear marker perfusion scan November 23, 2021.  Pharmacological.  Breast attenuation and diaphragmatic attenuation.  Small apical inferior apical lateral fixed defect suggestive of scar.  LV ejection fraction 57%. [de-identified] : October 15, 2021.  LV ejection fraction 50%.  Mild mitral stenosis noted. [de-identified] : Bilateral carotid Doppler study.  October 15, 2021.  Bilateral 50 to 69% stenosis.  Moderate atherosclerosis.  Progressive worsening noted compared to last year.

## 2021-11-30 NOTE — REASON FOR VISIT
[Symptom and Test Evaluation] : symptom and test evaluation [Structural Heart and Valve Disease] : structural heart and valve disease [Hyperlipidemia] : hyperlipidemia [Hypertension] : hypertension [Carotid, Aortic and Peripheral Vascular Disease] : carotid, aortic and peripheral vascular disease [Family Member] : family member [FreeTextEntry3] : Dr. London [FreeTextEntry1] : \par 81-year-old female had a telephone visit today.  Reviewed echocardiogram, carotid Doppler study, nuclear myocardial perfusion scan.\par Since last seen she has seen pulmonologist.  Started on trilogy.  Mild pounding of the heart beats have been noted.\par She has stable exertional dyspnea.  There is no chest pain.\par She has continued smoking\par She denies any PND orthopnea pedal edema\par She denies any claudication pain\par She walks with a cane.\par No recent fall, no near syncopal or syncopal event\par No bleeding complications.\par Significant leg cramps on pravastatin.  She stopped it for 1 week with improved symptoms.  She is back on it.\par \par

## 2021-11-30 NOTE — DISCUSSION/SUMMARY
[FreeTextEntry1] : ALOK MENDIETA is a 81 year old F who presents today  with the above history and the following active issues: \par \par Dyspnea on exertion.  Nonrheumatic mitral regurgitation and mitral stenosis.  Stable.  No signs of CHF.  Echocardiogram reviewed.  Myocardial perfusion scan reviewed.  LV ejection fraction 57%.  Scar noted.  No significant high risk ischemia.  Continue aggressive lifestyle and risk factor modifications.  Change in clinical status would require invasive coronary angiography guided therapy.  Smoking cessation reviewed.  She understands and agrees with the management plan.\par \par Carotid atherosclerosis: Progression noted.  LDL cholesterol less than 70.  Continue Plavix and Pravastatin.  Smoking cessation strongly recommended.\par \par HTN:   Continue present regimen medication.\par \par HLD: Significant myalgia on pravastatin.  We will try to change it to rosuvastatin 20 mg.  She will start every other day.  Increase to daily as tolerated.  Repeat CMP lipid panel and total CPK in 2 months.  If unable to tolerate statin therapy she should consider PCSK9 inhibitor.  Reviewed risk benefits alternatives of the lipid management with her and her niece.  They both understand and agrees with the management plan.\par \par Diabetes: Ongoing f/u with PCP.  Very well controlled hemoglobin A1c in the past.\par \par Factor V Leiden/Colon Ca: Ongoing f/u with heme/onc Dr. Mcfadden.  Continue anticoagulation as per hematologist\par \par Counseling regarding low saturated fat, salt and carbohydrate intake was reviewed. Active lifestyle and regular. Exercise along with weight management is advised.\par All the above were at length reviewed. Answered all the questions. Thank you very much for this kind referral. Please do not hesitate to give me a call for any question.\par Part of this transcription was done with voice recognition software and phonetically similar errors are common. I apologize for that. Please do not hesitate to call for any questions due to above.\par \par Follow-up in 4 to 6 months or for any change in her symptoms\par

## 2022-06-10 ENCOUNTER — APPOINTMENT (OUTPATIENT)
Dept: CARDIOLOGY | Facility: CLINIC | Age: 82
End: 2022-06-10

## 2022-12-15 ENCOUNTER — NON-APPOINTMENT (OUTPATIENT)
Age: 82
End: 2022-12-15

## 2022-12-15 ENCOUNTER — APPOINTMENT (OUTPATIENT)
Dept: CARDIOLOGY | Facility: CLINIC | Age: 82
End: 2022-12-15
Payer: MEDICARE

## 2022-12-15 VITALS
BODY MASS INDEX: 17.66 KG/M2 | SYSTOLIC BLOOD PRESSURE: 126 MMHG | TEMPERATURE: 97.1 F | DIASTOLIC BLOOD PRESSURE: 50 MMHG | WEIGHT: 96 LBS | HEART RATE: 81 BPM | HEIGHT: 62 IN | OXYGEN SATURATION: 96 %

## 2022-12-15 PROCEDURE — 93000 ELECTROCARDIOGRAM COMPLETE: CPT | Mod: NC

## 2022-12-15 PROCEDURE — 99214 OFFICE O/P EST MOD 30 MIN: CPT | Mod: 25

## 2022-12-15 RX ORDER — FLUTICASONE FUROATE, UMECLIDINIUM BROMIDE AND VILANTEROL TRIFENATATE 100; 62.5; 25 UG/1; UG/1; UG/1
100-62.5-25 POWDER RESPIRATORY (INHALATION) DAILY
Refills: 0 | Status: DISCONTINUED | COMMUNITY
End: 2022-12-15

## 2022-12-17 NOTE — DISCUSSION/SUMMARY
[FreeTextEntry1] : ALOK MENDIETA is a 82 year old F who presents today  with the above history and the following active issues: \par \par Preoperative cardiovascular examination for colonoscopy\par _________\par At present, there are no active cardiac conditions. \par No recent unstable coronary syndromes, decompensated heart failure, severe valvular heart disease or significant dysrhythmias.  \par Baseline functional status is acceptable    \par The clinical benefit of the proposed procedure outweighs the associated cardiovascular risk.  \par Risk not attenuated with further CV testing.  \par Prior testing as outlined above.\par Optimized from a cardiovascular perspective.\par DVT ppx\par Hold Plavix 5 days 5 days prior to procedure. Resume after\par She will follow up with Heme regarding Eliquis protocol. \par \par Dyspnea on exertion.  Nonrheumatic mitral regurgitation and mitral stenosis.  Stable.  No signs of CHF.  Echocardiogram will be followed for nonrheumatic valvular heart disease.  LV ejection fraction LV wall motion.\par \par CAD s/p CABG- on plavix\par \par Carotid atherosclerosis: Continue Plavix- \par \par HLD/CAD\par -she has stopped her crestor due to myalgias. She did not call the office to make us aware.\par -she agrees to trial 1/2 tablet daily. She will follow up with with Dr Manning in January to discuss further treatment options\par Discussed in detail reasoning why she needs to stay on her statin therapy and if unable to tolerate she needs to discuss with us. Daughter present as well. All questions answered\par \par HTN: Much better controlled.  Continue present regimen medication\par \par Diabetes: Ongoing f/u with PCP.  Very well controlled hemoglobin A1c.\par \par Factor V Leiden/Colon Ca: Ongoing f/u with heme/onc Dr. Mcfadden.  Continue anticoagulation as per hematologist-Eliquis\par \par Declines smoking cessation\par Follow up in January for routine cardiovascular OV\par Sincerely,\par \par Kristin Christian ANP-C\par Patients history, testing, and plan reviewed with supervising MD: Dr. Carlos More MD, [facility], [facility] \par

## 2022-12-17 NOTE — CARDIOLOGY SUMMARY
[de-identified] : December 15, 2022 NSR, IRBBB\par September 28, 2021.  Normal sinus rhythm. [de-identified] : October 15, 2021.  LV ejection fraction 50%.  Mild mitral stenosis noted. [de-identified] : Nuclear marker perfusion scan November 23, 2021.  Pharmacological.  Breast attenuation and diaphragmatic attenuation.  Small apical inferior apical lateral fixed defect suggestive of scar.  LV ejection fraction 57%. [de-identified] : Bilateral carotid Doppler study.  October 15, 2021.  Bilateral 50 to 69% stenosis.  Moderate atherosclerosis.  Progressive worsening noted compared to last year. [de-identified] : Labd 12/5/2022: Creat 0.70 K+ 4.8 Asr 15 Alt 12 Hgb 11

## 2022-12-17 NOTE — REVIEW OF SYSTEMS
[SOB] : shortness of breath [Dyspnea on exertion] : dyspnea during exertion [Palpitations] : palpitations [Joint Pain] : joint pain [Joint Stiffness] : joint stiffness [Myalgia] : myalgia [Chest Discomfort] : no chest discomfort [Lower Ext Edema] : no extremity edema [Leg Claudication] : no intermittent leg claudication [Orthopnea] : no orthopnea [PND] : no PND [Syncope] : no syncope [Coughing Up Blood] : no hemoptysis [Snoring] : no snoring [Negative] : Respiratory

## 2022-12-17 NOTE — HISTORY OF PRESENT ILLNESS
[FreeTextEntry1] : Past medical history of 3 vessel CAD s/p PCI and CABG, HTN, HLD, carotid stenosis, Factor V Leiden deficiency, PVD, diabetes, current smoker, colon ca s/p chemo (following with heme onc Dr. Mcfadden).\par \par \par

## 2022-12-17 NOTE — ASSESSMENT
[FreeTextEntry1] : past tests for reference:\par Testing:\par \par Carotids 7/7/20: RCCA and LCCA show moderate atherosclerotic dz without obstruction. CHARLY and LICA <50% stenosis. Moderate to severe disease in the left bulb. LECA velocities elevated elevated suggestive of narrowing. Moderate disease i the right bulb. \par \par \par Echo 2/25/20: EF 60%. Mild MR, Minimal AR. Normal wall motion. Mod DD\par \par Lexiscan nuclear stress test 2/25/20: No evidence of ischemia by EKG. Mild to mod defect in apical wall that is partially reversible suggestive of infarction with minimal per infarct ischemia. Attenuation artifact noted.\par \par Labs 2/24/20: Na 132, K 4.3, Gluc 229, Cr 0.79, AST 13, ALT 12, Trigs 90, HDl 51, LDL 60, Chol 129\par \par EKG 2/10/20 demonstrates SR at 83 bpm, RBBB, nonspecific ST segment changes, WV interval 178 ms, QTc 416 ms\par \par Labs 8/1/19: Cr 0.83, K 4.4, ALT 34, AST 69\par \par Echo 2/14/18: EF 50%. Minimal AR. Mild segmental LVSF with hypokinetic mid and distal septum. Septal motion is abnormal. H/o CABG. Minimal TR.. Minimal WV.\par \par Labs 2/13/18: Hct 37\par \par Nuclear stress testing 9/27/16: No evidence of ischemia by EKG. Abnormal SPECT perfusion scan was ntoed: There is a small sized area of infarction with aleah-infarct ischemia in the apical anterior region. Anterior wall attenuation is noted. This reduces the specificity of the study. \par \par Reviewed September 28, 2021.  Globin A1c 5.6.\par No other labs available.\par EKG.  Normal sinus rhythm.\par Echocardiogram February 25, 2020.  Mild mitral regurgitation mild mitral stenosis PASP 23.  Normal left atrium.  Moderate diastolic dysfunction.\par Bilateral carotid Doppler study July 7, 2020.  Moderate heterogeneous plaque bilaterally with less than 50% stenosis.\par

## 2022-12-17 NOTE — PHYSICAL EXAM
[No Acute Distress] : no acute distress [Frail] : frail [Normal S1, S2] : normal S1, S2 [No Rub] : no rub [No Gallop] : no gallop [No Respiratory Distress] : no respiratory distress  [No Edema] : no edema [No Rash] : no rash [Moves all extremities] : moves all extremities [Alert and Oriented] : alert and oriented [Good Air Entry] : good air entry [Catie ____] : catie [unfilled] [de-identified] : + carotid bruit [de-identified] : Systolic ejection murmur 1-2 over 6. [de-identified] : Walks with a cane [de-identified] : Palpable bilateral distal pulses

## 2022-12-17 NOTE — REASON FOR VISIT
[Structural Heart and Valve Disease] : structural heart and valve disease [Hyperlipidemia] : hyperlipidemia [Hypertension] : hypertension [Carotid, Aortic and Peripheral Vascular Disease] : carotid, aortic and peripheral vascular disease [Family Member] : family member [FreeTextEntry3] : Dr. London [FreeTextEntry1] : 82-year-old female comes in for Cardiac Clearance for Colonoscopy\par \par She has stable exertional dyspnea.  There is no chest pain.\par She has continued smoking\par She denies any PND orthopnea pedal edema\par She denies any claudication pain\par She walks with a cane.\par No recent fall, no near syncopal or syncopal event\par No bleeding complications.\par Denies any CVA or TIA symptoms \par \par She is having a colonoscopy on January 6, 2023 at [facility] [place]. \par She has stopped her Crestor. She denies chest pain, pressure, palpitations,  LE edema, lightheadedness, dizziness, near syncope or syncope. \par \par

## 2023-01-31 ENCOUNTER — APPOINTMENT (OUTPATIENT)
Dept: CARDIOLOGY | Facility: CLINIC | Age: 83
End: 2023-01-31
Payer: MEDICARE

## 2023-01-31 VITALS
DIASTOLIC BLOOD PRESSURE: 60 MMHG | HEIGHT: 62 IN | BODY MASS INDEX: 17.48 KG/M2 | HEART RATE: 85 BPM | WEIGHT: 95 LBS | SYSTOLIC BLOOD PRESSURE: 132 MMHG | OXYGEN SATURATION: 98 %

## 2023-01-31 PROCEDURE — 99214 OFFICE O/P EST MOD 30 MIN: CPT

## 2023-01-31 PROCEDURE — 99406 BEHAV CHNG SMOKING 3-10 MIN: CPT

## 2023-01-31 NOTE — REASON FOR VISIT
[Symptom and Test Evaluation] : symptom and test evaluation [Structural Heart and Valve Disease] : structural heart and valve disease [Hyperlipidemia] : hyperlipidemia [Hypertension] : hypertension [Carotid, Aortic and Peripheral Vascular Disease] : carotid, aortic and peripheral vascular disease [Family Member] : family member [FreeTextEntry3] : Dr. London [FreeTextEntry1] : 82-year-old female comes in for follow-up consultation.\par I reviewed labs.\par Since last seen her port was taken out and she no longer needs any chemotherapy.\par She has stable exertional dyspnea.  There is no chest pain.\par She has continued smoking\par She denies any PND orthopnea pedal edema\par She denies any claudication pain\par She walks with a cane.\par No recent fall, no near syncopal or syncopal event\par No bleeding complications.\par \par \par

## 2023-01-31 NOTE — COUNSELING
[Yes] : Risk of tobacco use and health benefits of smoking cessation discussed: Yes [Cessation strategies including cessation program discussed] : Cessation strategies including cessation program discussed [Use of nicotine replacement therapies and other medications discussed] : Use of nicotine replacement therapies and other medications discussed [Encouraged to pick a quit date and identify support needed to quit] : Encouraged to pick a quit date and identify support needed to quit [No] : Not willing to quit smoking [FreeTextEntry2] : She is not ready to quit [FreeTextEntry1] : 3

## 2023-01-31 NOTE — PHYSICAL EXAM
[Well Nourished] : well nourished [No Acute Distress] : no acute distress [Frail] : frail [Normal Conjunctiva] : normal conjunctiva [Normal Venous Pressure] : normal venous pressure [No Carotid Bruit] : no carotid bruit [Normal S1, S2] : normal S1, S2 [No Rub] : no rub [No Gallop] : no gallop [No Respiratory Distress] : no respiratory distress  [Wheeze ____] : wheeze [unfilled] [Soft] : abdomen soft [Non Tender] : non-tender [No Masses/organomegaly] : no masses/organomegaly [Normal Bowel Sounds] : normal bowel sounds [Normal Gait] : normal gait [No Edema] : no edema [No Cyanosis] : no cyanosis [No Clubbing] : no clubbing [No Varicosities] : no varicosities [No Rash] : no rash [No Skin Lesions] : no skin lesions [Moves all extremities] : moves all extremities [No Focal Deficits] : no focal deficits [Normal Speech] : normal speech [Alert and Oriented] : alert and oriented [Normal memory] : normal memory [de-identified] : Decreased carotid upstroke [de-identified] : Systolic ejection murmur 2/6 [de-identified] : Palpable bilateral distal pulses

## 2023-01-31 NOTE — DISCUSSION/SUMMARY
[FreeTextEntry1] : ALOK MENDIETA is a 82year old F who presents today  with the above history and the following active issues: \par \par Dyspnea on exertion.  Nonrheumatic mitral regurgitation and mitral stenosis. .  No signs of CHF.  Last echocardiogram reviewed.  Myocardial perfusion scan reviewed.  LV ejection fraction 57%.  Scar noted.  No significant high risk ischemia.  Continue aggressive lifestyle and risk factor modifications.  Change in clinical status would require invasive coronary angiography guided therapy.  Smoking cessation reviewed.  She understands and agrees with the management plan.  Repeat echocardiogram would be helpful to rule out any progression of valvular disease and associated cardiomyopathy\par \par Carotid atherosclerosis: Progression noted.  LDL cholesterol less than 70.  Continue Plavix and Pravastatin.  Smoking cessation strongly recommended.  Follow-up on carotid Doppler study considering progression was noted during the last evaluation in moderate range.  If any significant worsening vascular evaluation.\par \par HTN:   Continue present regimen medication.  Goal less than 130/80.  Smoking cessation.  No evidence of CHF\par \par HLD: Significant myalgia on pravastatin.  Tolerating rosuvastatin well.  Labs ordered.  LDL goal less than 70\par \par Diabetes: Ongoing f/u with PCP.  Very well controlled hemoglobin A1c in the past.\par \par Factor V Leiden/Colon Ca: Ongoing f/u with heme/onc Dr. Mcfadden.  Continue anticoagulation as per hematologist\par \par Counseling regarding low saturated fat, salt and carbohydrate intake was reviewed. Active lifestyle and regular. Exercise along with weight management is advised.\par All the above were at length reviewed. Answered all the questions. Thank you very much for this kind referral. Please do not hesitate to give me a call for any question.\par Part of this transcription was done with voice recognition software and phonetically similar errors are common. I apologize for that. Please do not hesitate to call for any questions due to above.\par \par Follow-up in 4 to 6 months or for any change in her symptoms\par

## 2023-01-31 NOTE — ASSESSMENT
[FreeTextEntry1] : past tests for reference:\par Testing:\par \par Carotids 7/7/20: RCCA and LCCA show moderate atherosclerotic dz without obstruction. CHARLY and LICA <50% stenosis. Moderate to severe disease in the left bulb. LECA velocities elevated elevated suggestive of narrowing. Moderate disease i the right bulb. \par \par \par Echo 2/25/20: EF 60%. Mild MR, Minimal AR. Normal wall motion. Mod DD\par \par Lexiscan nuclear stress test 2/25/20: No evidence of ischemia by EKG. Mild to mod defect in apical wall that is partially reversible suggestive of infarction with minimal per infarct ischemia. Attenuation artifact noted.\par \par Labs 2/24/20: Na 132, K 4.3, Gluc 229, Cr 0.79, AST 13, ALT 12, Trigs 90, HDl 51, LDL 60, Chol 129\par \par EKG 2/10/20 demonstrates SR at 83 bpm, RBBB, nonspecific ST segment changes, NE interval 178 ms, QTc 416 ms\par \par Labs 8/1/19: Cr 0.83, K 4.4, ALT 34, AST 69\par \par Echo 2/14/18: EF 50%. Minimal AR. Mild segmental LVSF with hypokinetic mid and distal septum. Septal motion is abnormal. H/o CABG. Minimal TR.. Minimal NE.\par \par Labs 2/13/18: Hct 37\par \par Nuclear stress testing 9/27/16: No evidence of ischemia by EKG. Abnormal SPECT perfusion scan was ntoed: There is a small sized area of infarction with aleah-infarct ischemia in the apical anterior region. Anterior wall attenuation is noted. This reduces the specificity of the study. \par \par Reviewed September 28, 2021.  Globin A1c 5.6.\par No other labs available.\par EKG.  Normal sinus rhythm.\par Echocardiogram February 25, 2020.  Mild mitral regurgitation mild mitral stenosis PASP 23.  Normal left atrium.  Moderate diastolic dysfunction.\par Bilateral carotid Doppler study July 7, 2020.  Moderate heterogeneous plaque bilaterally with less than 50% stenosis.\par \par Reviewed on October 15, 2021.\par Labs from December 7, 2020 had shown LDL cholesterol of 46 HDL 54 triglycerides 64 total cholesterol 113 TSH 4.19 creatinine 0.86.\par Echocardiogram carotid Doppler study as noted above\par \par Reviewed on November 30, 2021.\par Labs from September 22, 2021, carotid Doppler study, echocardiogram, nuclear marker perfusion scan reviewed.  As noted above.

## 2023-01-31 NOTE — REVIEW OF SYSTEMS
[SOB] : shortness of breath [Dyspnea on exertion] : dyspnea during exertion [Palpitations] : palpitations [Wheezing] : wheezing [Joint Pain] : joint pain [Joint Stiffness] : joint stiffness [Myalgia] : myalgia [Negative] : Heme/Lymph [Chest Discomfort] : no chest discomfort [Lower Ext Edema] : no extremity edema [Leg Claudication] : no intermittent leg claudication [Orthopnea] : no orthopnea [PND] : no PND [Syncope] : no syncope [Cough] : no cough [Coughing Up Blood] : no hemoptysis [Snoring] : no snoring

## 2023-01-31 NOTE — CARDIOLOGY SUMMARY
[de-identified] : September 28, 2021.  Normal sinus rhythm. [de-identified] : Nuclear marker perfusion scan November 23, 2021.  Pharmacological.  Breast attenuation and diaphragmatic attenuation.  Small apical inferior apical lateral fixed defect suggestive of scar.  LV ejection fraction 57%. [de-identified] : October 15, 2021.  LV ejection fraction 50%.  Mild mitral stenosis noted. [de-identified] : Bilateral carotid Doppler study.  October 15, 2021.  Bilateral 50 to 69% stenosis.  Moderate atherosclerosis.  Progressive worsening noted compared to last year.

## 2023-03-08 ENCOUNTER — APPOINTMENT (OUTPATIENT)
Dept: CARDIOLOGY | Facility: CLINIC | Age: 83
End: 2023-03-08
Payer: MEDICARE

## 2023-03-08 VITALS
WEIGHT: 100 LBS | DIASTOLIC BLOOD PRESSURE: 68 MMHG | TEMPERATURE: 97.2 F | BODY MASS INDEX: 18.4 KG/M2 | OXYGEN SATURATION: 99 % | SYSTOLIC BLOOD PRESSURE: 140 MMHG | HEART RATE: 90 BPM | HEIGHT: 62 IN

## 2023-03-08 PROCEDURE — 93880 EXTRACRANIAL BILAT STUDY: CPT

## 2023-03-08 PROCEDURE — 93306 TTE W/DOPPLER COMPLETE: CPT

## 2023-03-08 PROCEDURE — 99214 OFFICE O/P EST MOD 30 MIN: CPT

## 2023-03-08 NOTE — DISCUSSION/SUMMARY
[FreeTextEntry1] : ALOK MENDIETA  is a 82 year F  who presents today Mar 08, 2023 \par \par Dyspnea on exertion.  Nonrheumatic mitral regurgitation and mitral stenosis. .  No signs of CHF.  Echocardiogram reviewed and with no significant change.\par \par Carotid atherosclerosis: Progression noted. Continue Plavix and Pravastatin.  Smoking cessation strongly recommended. Carotid US with progression of disease.\par Recommend CTA of the neck. Follow-up with vascular\par \par HTN:   Continue present regimen medication.  Goal less than 130/80.  Smoking cessation.  No evidence of CHF\par \par HLD: Significant myalgia on pravastatin.  Tolerating rosuvastatin well.  Labs ordered.  LDL goal less than 70\par \par Diabetes: Ongoing f/u with PCP.  Very well controlled hemoglobin A1c in the past.\par \par Factor V Leiden/Colon Ca: Ongoing f/u with heme/onc Dr. Mcfadden.  Continue anticoagulation as per hematologist\par \par Red flag symptoms which would warrant sooner emergent evaluation reviewed with the patient. \par Questions and concerns were addressed and answered. \par Limitations of non-invasive testing reviewed\par \par Sincerely,\par \par Jyoti Dangelo PA-C\par Patients history, testing and plan reviewed with supervising MD: Dr. Kayley Caldwell

## 2023-03-08 NOTE — HISTORY OF PRESENT ILLNESS
[FreeTextEntry1] : ALOK MENDIETA  is a 82 year F  who presents today Mar 08, 2023 for echo, carotid US and clinical follow-up. \par Overall he has been feeling well. There has been no recent illness or hospital stay. Medications have remained unchanged. Asymptomatic from cardiovascular and arrhythmia standpoint.  \par She no longer needs any chemotherapy.\par She has continued smoking\par Stable VICTOR \par \par Today she denies chest pain, pressure, unusual shortness of breath, lightheadedness, dizziness, near syncope or syncope. 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 Mcfadden).\par \par \par

## 2023-03-08 NOTE — CARDIOLOGY SUMMARY
[de-identified] : September 28, 2021.  Normal sinus rhythm. [de-identified] : Nuclear marker perfusion scan November 23, 2021.  Pharmacological.  Breast attenuation and diaphragmatic attenuation.  Small apical inferior apical lateral fixed defect suggestive of scar.  LV ejection fraction 57%. [de-identified] : October 15, 2021.  LV ejection fraction 50%.  Mild mitral stenosis noted.\par \par Echo 3/8/2023 EF 50%, mild LV diastolic dysfunction, trace AR, MALA [de-identified] : Bilateral carotid Doppler study.  October 15, 2021.  Bilateral 50 to 69% stenosis.  Moderate atherosclerosis.  Progressive worsening noted compared to last year.\par \par Carotid US 3/8/2023 progression of atherosclerosis 144 89

## 2023-03-08 NOTE — ASSESSMENT
[FreeTextEntry1] : past tests for reference:\par Testing:\par \par Carotids 7/7/20: RCCA and LCCA show moderate atherosclerotic dz without obstruction. CHARLY and LICA <50% stenosis. Moderate to severe disease in the left bulb. LECA velocities elevated elevated suggestive of narrowing. Moderate disease i the right bulb. \par \par \par Echo 2/25/20: EF 60%. Mild MR, Minimal AR. Normal wall motion. Mod DD\par \par Lexiscan nuclear stress test 2/25/20: No evidence of ischemia by EKG. Mild to mod defect in apical wall that is partially reversible suggestive of infarction with minimal per infarct ischemia. Attenuation artifact noted.\par \par Labs 2/24/20: Na 132, K 4.3, Gluc 229, Cr 0.79, AST 13, ALT 12, Trigs 90, HDl 51, LDL 60, Chol 129\par \par EKG 2/10/20 demonstrates SR at 83 bpm, RBBB, nonspecific ST segment changes, AL interval 178 ms, QTc 416 ms\par \par Labs 8/1/19: Cr 0.83, K 4.4, ALT 34, AST 69\par \par Echo 2/14/18: EF 50%. Minimal AR. Mild segmental LVSF with hypokinetic mid and distal septum. Septal motion is abnormal. H/o CABG. Minimal TR.. Minimal AL.\par \par Labs 2/13/18: Hct 37\par \par Nuclear stress testing 9/27/16: No evidence of ischemia by EKG. Abnormal SPECT perfusion scan was ntoed: There is a small sized area of infarction with aleah-infarct ischemia in the apical anterior region. Anterior wall attenuation is noted. This reduces the specificity of the study. \par \par Reviewed September 28, 2021.  Globin A1c 5.6.\par No other labs available.\par EKG.  Normal sinus rhythm.\par Echocardiogram February 25, 2020.  Mild mitral regurgitation mild mitral stenosis PASP 23.  Normal left atrium.  Moderate diastolic dysfunction.\par Bilateral carotid Doppler study July 7, 2020.  Moderate heterogeneous plaque bilaterally with less than 50% stenosis.\par \par Reviewed on October 15, 2021.\par Labs from December 7, 2020 had shown LDL cholesterol of 46 HDL 54 triglycerides 64 total cholesterol 113 TSH 4.19 creatinine 0.86.\par Echocardiogram carotid Doppler study as noted above\par \par Reviewed on November 30, 2021.\par Labs from September 22, 2021, carotid Doppler study, echocardiogram, nuclear marker perfusion scan reviewed.  As noted above.

## 2023-03-08 NOTE — REASON FOR VISIT
[Symptom and Test Evaluation] : symptom and test evaluation [Structural Heart and Valve Disease] : structural heart and valve disease [Hyperlipidemia] : hyperlipidemia [Hypertension] : hypertension [Carotid, Aortic and Peripheral Vascular Disease] : carotid, aortic and peripheral vascular disease [Family Member] : family member [FreeTextEntry3] : Dr. London [FreeTextEntry1] : \par

## 2023-03-08 NOTE — PHYSICAL EXAM
[Well Nourished] : well nourished [No Acute Distress] : no acute distress [Frail] : frail [Normal Conjunctiva] : normal conjunctiva [Normal Venous Pressure] : normal venous pressure [No Carotid Bruit] : no carotid bruit [Normal S1, S2] : normal S1, S2 [No Rub] : no rub [No Gallop] : no gallop [No Respiratory Distress] : no respiratory distress  [Wheeze ____] : wheeze [unfilled] [Soft] : abdomen soft [Non Tender] : non-tender [No Masses/organomegaly] : no masses/organomegaly [Normal Bowel Sounds] : normal bowel sounds [Normal Gait] : normal gait [No Edema] : no edema [No Cyanosis] : no cyanosis [No Clubbing] : no clubbing [No Varicosities] : no varicosities [No Rash] : no rash [No Skin Lesions] : no skin lesions [Moves all extremities] : moves all extremities [No Focal Deficits] : no focal deficits [Normal Speech] : normal speech [Alert and Oriented] : alert and oriented [Normal memory] : normal memory [de-identified] : Decreased carotid upstroke [de-identified] : Systolic ejection murmur 2/6 [de-identified] : Palpable bilateral distal pulses

## 2023-03-28 ENCOUNTER — NON-APPOINTMENT (OUTPATIENT)
Age: 83
End: 2023-03-28

## 2023-07-05 ENCOUNTER — APPOINTMENT (OUTPATIENT)
Dept: CARDIOLOGY | Facility: CLINIC | Age: 83
End: 2023-07-05
Payer: MEDICARE

## 2023-07-05 ENCOUNTER — NON-APPOINTMENT (OUTPATIENT)
Age: 83
End: 2023-07-05

## 2023-07-05 VITALS
DIASTOLIC BLOOD PRESSURE: 50 MMHG | HEIGHT: 62 IN | BODY MASS INDEX: 18.03 KG/M2 | WEIGHT: 98 LBS | OXYGEN SATURATION: 97 % | HEART RATE: 78 BPM | SYSTOLIC BLOOD PRESSURE: 128 MMHG

## 2023-07-05 DIAGNOSIS — Z01.810 ENCOUNTER FOR PREPROCEDURAL CARDIOVASCULAR EXAMINATION: ICD-10-CM

## 2023-07-05 DIAGNOSIS — E11.9 TYPE 2 DIABETES MELLITUS W/OUT COMPLICATIONS: ICD-10-CM

## 2023-07-05 PROCEDURE — 99214 OFFICE O/P EST MOD 30 MIN: CPT

## 2023-07-05 RX ORDER — METHYLPREDNISOLONE 32 MG/1
32 TABLET ORAL
Qty: 2 | Refills: 0 | Status: DISCONTINUED | COMMUNITY
Start: 2023-03-24 | End: 2023-07-05

## 2023-07-05 NOTE — HISTORY OF PRESENT ILLNESS
[FreeTextEntry1] : ALOK MENDIETA  is a 83 year F  who presents today Jul 05, 2023 for preoperative clearance for upcoming colonoscopy/endoscopy. Following with hematology for anemia.  \par Overall she has been feeling well. There has been no recent illness or hospital stay. Medications have remained unchanged. Asymptomatic from cardiovascular and arrhythmia standpoint.  \par She no longer needs any chemotherapy.\par She has continued smoking\par Stable VICTOR \par \par Today she denies chest pain, pressure, unusual shortness of breath, lightheadedness, dizziness, near syncope or syncope. 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 Mcfadden).\par \par \par

## 2023-07-05 NOTE — CARDIOLOGY SUMMARY
[de-identified] : September 28, 2021.  Normal sinus rhythm. [de-identified] : Nuclear marker perfusion scan November 23, 2021.  Pharmacological.  Breast attenuation and diaphragmatic attenuation.  Small apical inferior apical lateral fixed defect suggestive of scar.  LV ejection fraction 57%. [de-identified] : October 15, 2021.  LV ejection fraction 50%.  Mild mitral stenosis noted.\par \par Echo 3/8/2023 EF 50%, mild LV diastolic dysfunction, trace AR, MALA [de-identified] : CT neck 3/2023 bilateral atherosclerosis [de-identified] : Bilateral carotid Doppler study.  October 15, 2021.  Bilateral 50 to 69% stenosis.  Moderate atherosclerosis.  Progressive worsening noted compared to last year.\par \par Carotid US 3/8/2023 progression of atherosclerosis

## 2023-07-05 NOTE — DISCUSSION/SUMMARY
[FreeTextEntry1] : ALOK MENDIETA  is a 83 year F  who presents today Jul 05, 2023 with the above history and the following active issues. \par \par Dyspnea on exertion.  Nonrheumatic mitral regurgitation and mitral stenosis. .  No signs of CHF.  Echocardiogram reviewed and with no significant change.\par \par Carotid atherosclerosis: Continue Plavix and Pravastatin.  Smoking cessation strongly recommended.\par Continue to follow with vascular. \par \par HTN:   Continue present regimen medication.  Goal less than 130/80.  Smoking cessation.  No evidence of CHF\par \par HLD: Significant myalgia on pravastatin.  Tolerating rosuvastatin well.  Labs ordered.  LDL goal less than 70\par \par Diabetes: Ongoing f/u with PCP.  Very well controlled hemoglobin A1c in the past.\par \par Factor V Leiden/Colon Ca: Ongoing f/u with heme/onc Dr. Mcfadden.  Continue anticoagulation as per hematologist\par \par Preoperative status for colonoscopy/endoscopy\par At present, there are no active cardiac conditions. \par No recent unstable coronary syndromes, decompensated heart failure, severe valvular heart disease or significant dysrhythmias.  \par Baseline functional status is limited with no new exertional complaints \par The clinical benefit of the proposed procedure outweighs the associated cardiovascular risk.  \par Risk not attenuated with further CV testing.  \par Prior testing as outlined above.\par Optimized from a cardiovascular perspective.\par Minimize time off plavix While holding Plavix recommend she take ASA 81mg QD. \par \par Red flag symptoms which would warrant sooner emergent evaluation reviewed with the patient. \par Questions and concerns were addressed and answered. \par \par Sincerely,\par \par Jyoti Dangelo PA-C\par Patients history, testing and plan reviewed with supervising MD: Dr. Kayley Caldwell

## 2023-07-05 NOTE — REVIEW OF SYSTEMS
[Dyspnea on exertion] : dyspnea during exertion [Wheezing] : wheezing [Joint Pain] : joint pain [Joint Stiffness] : joint stiffness [Myalgia] : myalgia [Negative] : Heme/Lymph [SOB] : no shortness of breath [Chest Discomfort] : no chest discomfort [Lower Ext Edema] : no extremity edema [Leg Claudication] : no intermittent leg claudication [Palpitations] : no palpitations [Orthopnea] : no orthopnea [PND] : no PND [Syncope] : no syncope [Cough] : no cough [Coughing Up Blood] : no hemoptysis [Snoring] : no snoring

## 2023-07-05 NOTE — PHYSICAL EXAM
[Well Nourished] : well nourished [No Acute Distress] : no acute distress [Frail] : frail [Normal Conjunctiva] : normal conjunctiva [Normal Venous Pressure] : normal venous pressure [No Carotid Bruit] : no carotid bruit [Normal S1, S2] : normal S1, S2 [No Rub] : no rub [No Gallop] : no gallop [No Respiratory Distress] : no respiratory distress  [Wheeze ____] : wheeze [unfilled] [Soft] : abdomen soft [Non Tender] : non-tender [No Masses/organomegaly] : no masses/organomegaly [Normal Bowel Sounds] : normal bowel sounds [Normal Gait] : normal gait [No Edema] : no edema [No Cyanosis] : no cyanosis [No Clubbing] : no clubbing [No Varicosities] : no varicosities [No Rash] : no rash [No Skin Lesions] : no skin lesions [Moves all extremities] : moves all extremities [No Focal Deficits] : no focal deficits [Normal Speech] : normal speech [Alert and Oriented] : alert and oriented [Normal memory] : normal memory [de-identified] : Decreased carotid upstroke [de-identified] : Systolic ejection murmur 2/6 [de-identified] : Palpable bilateral distal pulses

## 2023-07-19 ENCOUNTER — APPOINTMENT (OUTPATIENT)
Dept: CARDIOLOGY | Facility: CLINIC | Age: 83
End: 2023-07-19
Payer: MEDICARE

## 2023-07-19 VITALS
OXYGEN SATURATION: 98 % | DIASTOLIC BLOOD PRESSURE: 60 MMHG | WEIGHT: 95 LBS | HEIGHT: 62 IN | SYSTOLIC BLOOD PRESSURE: 132 MMHG | HEART RATE: 73 BPM | BODY MASS INDEX: 17.48 KG/M2

## 2023-07-19 DIAGNOSIS — F17.210 NICOTINE DEPENDENCE, CIGARETTES, UNCOMPLICATED: ICD-10-CM

## 2023-07-19 PROCEDURE — 99214 OFFICE O/P EST MOD 30 MIN: CPT | Mod: 25

## 2023-07-19 PROCEDURE — 99406 BEHAV CHNG SMOKING 3-10 MIN: CPT

## 2023-07-19 RX ORDER — LIDOCAINE 5% 700 MG/1
PATCH TOPICAL
Refills: 0 | Status: DISCONTINUED | COMMUNITY
End: 2023-07-19

## 2023-07-19 NOTE — ASSESSMENT
[FreeTextEntry1] : Reviewed July 19, 2023\par EKG as noted above\par Labs from May 8, 2023 normocytic anemia as sodium 134 potassium 4.4 creatinine 0.86 LFT normal TSH 3.62 LDL 44 HDL 57 triglycerides 73

## 2023-07-19 NOTE — HISTORY OF PRESENT ILLNESS
[FreeTextEntry1] : ALOK MENDIETA  is a 83 year F  who presents today July 19, 2023.  She is planning to have colonoscopy this week.  Her Plavix and Eliquis are being held.  Unable to take aspirin at present.  Possible blood loss anemia.\par Overall she has been feeling well. There has been no recent illness or hospital stay. Medications have remained unchanged. Asymptomatic from cardiovascular and arrhythmia standpoint.  \par She no longer needs any chemotherapy.\par She has continued smoking\par Stable VICTOR\par Walks with a cane\par \par Today she denies chest pain, pressure, unusual shortness of breath, lightheadedness, dizziness, near syncope or syncope. 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 Mcfadden).\par \par \par

## 2023-07-19 NOTE — REASON FOR VISIT
[facility] Virtual Visit Note    The patient location is: Home  The chief complaint leading to consultation is: skin lesion  Visit type: Virtual visit with synchronous audio and video  Total time spent with patient: 20 min  Each patient to whom he or she provides medical services by telemedicine is:  (1) informed of the relationship between the physician and patient and the respective role of any other health care provider with respect to management of the patient; and (2) notified that he or she may decline to receive medical services by telemedicine and may withdraw from such care at any time.      Chief Complaint      Chief Complaint   Patient presents with    Insect Bite       History of Present Illness      Zoë Enrique is a 72 y.o. female with chronic conditions of A fib, hypothyroidism, hx of osteosarcoma of right femur, tension headaches who presents today for: skin lesion on inner thigh.  Started 2 days ago with abrupt onset itching of inner right thigh with appearance of two red spots.  Had been outside most of the day that day.  Has been applying topical benadryl and triple antibiotic cream with lidocaine.  Lesions are getting larger since onset.  Denies fevers, chills, shortness of breath, palpitations.      Past Medical History:  Past Medical History:   Diagnosis Date    Atrial fibrillation 2017    Cancer     osteosarcoma of right femur    Diverticulosis     Hx of atrial flutter     Hx of mitral valve prolapse     Migraines        Past Surgical History:   has a past surgical history that includes Hysterectomy; Knee surgery (Right, ); Knee Arthroplasty (Right, ); Treatment of cardiac arrhythmia (N/A, 2019); Augmentation of breast (Bilateral);  section (); Eye surgery (); Breast surgery (); Joint replacement (, , ); Colon surgery (); and Cosmetic surgery ().    Family History:  family history includes Diabetes in her  sister; Hearing loss in her father; Heart disease in her brother, brother, father, mother, and sister.     Social History:  Social History     Tobacco Use    Smoking status: Never Smoker    Smokeless tobacco: Never Used   Substance Use Topics    Alcohol use: Yes     Alcohol/week: 1.0 standard drinks     Types: 1 drink(s) per week     Frequency: 2-4 times a month     Drinks per session: 1 or 2     Binge frequency: Never     Comment: socially    Drug use: No       Review of Systems   Constitutional: Negative for chills, fever and malaise/fatigue.        Pruritic red lesions on right inner thigh   HENT: Negative for hearing loss.    Eyes: Negative for discharge.   Respiratory: Negative for shortness of breath and wheezing.    Cardiovascular: Negative for chest pain and palpitations.   Gastrointestinal: Negative for blood in stool, constipation, diarrhea, nausea and vomiting.   Genitourinary: Negative for dysuria and hematuria.   Musculoskeletal: Negative for neck pain.   Skin: Negative for rash.   Neurological: Negative for weakness and headaches.   Endo/Heme/Allergies: Negative for polydipsia.        Medications:  Outpatient Encounter Medications as of 5/7/2020   Medication Sig Dispense Refill    acyclovir (ZOVIRAX) 400 MG tablet Take 1 tablet (400 mg total) by mouth 3 (three) times daily as needed. 60 tablet 5    amoxicillin (AMOXIL) 500 MG capsule       CALCIUM CARB/VIT D3/MINERALS (CALTRATE PLUS ORAL) Take by mouth. Takes two chews daily      diclofenac sodium (VOLTAREN) 1 % Gel Apply 2 g topically once daily. 100 g 2    docusate sodium (COLACE) 250 MG capsule Take 250 mg by mouth 2 (two) times daily.       dofetilide (TIKOSYN) 250 MCG Cap Take 1 capsule (250 mcg total) by mouth every 12 (twelve) hours. 60 capsule 11    HYDROcodone-acetaminophen (NORCO) 5-325 mg per tablet Take 1 tablet by mouth every 6 (six) hours as needed for Pain. Pt states takes PRN for headaches 28 tablet 0    levothyroxine  (SYNTHROID) 25 MCG tablet Take 1 tablet (25 mcg total) by mouth once daily. 90 tablet 3    melatonin 5 mg Cap Take 5 mg by mouth as needed.       rivaroxaban (XARELTO) 20 mg Tab Take 1 tablet (20 mg total) by mouth once daily. 90 tablet 3    sulfamethoxazole-trimethoprim 800-160mg (BACTRIM DS) 800-160 mg Tab Take 1 tablet by mouth 2 (two) times daily. 14 tablet 0    triamcinolone acetonide 0.1% (KENALOG) 0.1 % cream Apply topically 3 (three) times daily. 30 g 2    UNABLE TO FIND Diclofenac 0.15%, Lidocaine 2.25%, Prilocaine 2.25% in Compounded Topical Cream to be applied up to 5x/day as needed for headaches.      vit C/E/Zn/coppr/lutein/zeaxan (PRESERVISION AREDS-2 ORAL)        Facility-Administered Encounter Medications as of 5/7/2020   Medication Dose Route Frequency Provider Last Rate Last Dose    sodium chloride 0.9% flush 5 mL  5 mL Intravenous PRN Aleksandra Taylor NP           Allergies:  Review of patient's allergies indicates:   Allergen Reactions    Morphine Nausea And Vomiting    Succinylcholine      Other reaction(s): v tach       Health Maintenance:  Immunization History   Administered Date(s) Administered    Influenza 10/06/2018    Influenza - High Dose - PF (65 years and older) 01/04/2016, 11/03/2016, 09/28/2017, 10/30/2019    Influenza - Trivalent - Adjuvanted - PF 10/09/2018    Pneumococcal Conjugate - 13 Valent 01/04/2016    Tdap 02/13/2020      Health Maintenance   Topic Date Due    DEXA SCAN  03/07/1990    Colonoscopy  03/07/2000    Pneumococcal Vaccine (65+ High/Highest Risk) (2 of 2 - PPSV23) 02/29/2016    Mammogram  10/10/2021    Lipid Panel  01/23/2025    TETANUS VACCINE  02/13/2030    Hepatitis C Screening  Completed        Physical Exam       ]    Physical Exam   Constitutional: She is oriented to person, place, and time. She appears well-developed and well-nourished. No distress.   Eyes: Conjunctivae and EOM are normal. Right eye exhibits no discharge. Left eye  exhibits no discharge. No scleral icterus.   Pulmonary/Chest: Effort normal. No respiratory distress.   Neurological: She is alert and oriented to person, place, and time.   Skin: She is not diaphoretic.   See attached images for two erythematous plaques with surrounding erythema   Psychiatric: She has a normal mood and affect. Her behavior is normal. Judgment and thought content normal.            Laboratory:  CBC:  Recent Labs   Lab 08/13/19  0434 08/15/19  0323 01/23/20  0933   WBC 3.54 L 3.80 L 3.40 L   RBC 3.55 L 3.80 L 4.38   Hemoglobin 11.3 L 12.1 13.6   Hematocrit 35.0 L 37.4 43.2   Platelets 113 L 116 L 154   Mean Corpuscular Volume 99 H 98 99 H   Mean Corpuscular Hemoglobin 31.8 H 31.8 H 31.1 H   Mean Corpuscular Hemoglobin Conc 32.3 32.4 31.5 L     CMP:  Recent Labs   Lab 01/23/20  0933   Glucose 91   Calcium 9.6   Albumin 4.3   Total Protein 6.9   Sodium 141   Potassium 4.2   CO2 27   Chloride 106   BUN, Bld 21   Alkaline Phosphatase 63   ALT 16   AST 21   Total Bilirubin 0.8     URINALYSIS:       LIPIDS:  Recent Labs   Lab 08/12/19  1656 01/23/20  0933   TSH 1.854 2.533   HDL  --  75   Cholesterol  --  210 H   Triglycerides  --  80   LDL Cholesterol  --  119.0   Hdl/Cholesterol Ratio  --  35.7   Non-HDL Cholesterol  --  135   Total Cholesterol/HDL Ratio  --  2.8     TSH:  Recent Labs   Lab 08/12/19  1656 01/23/20  0933   TSH 1.854 2.533     A1C:        Assessment/Plan     Zoë Enrique is a 72 y.o.female with:    1. Spider bite wound, accidental or unintentional, initial encounter  - sulfamethoxazole-trimethoprim 800-160mg (BACTRIM DS) 800-160 mg Tab; Take 1 tablet by mouth 2 (two) times daily.  Dispense: 14 tablet; Refill: 0  - triamcinolone acetonide 0.1% (KENALOG) 0.1 % cream; Apply topically 3 (three) times daily.  Dispense: 30 g; Refill: 2    2. Cellulitis of right lower extremity  - sulfamethoxazole-trimethoprim 800-160mg (BACTRIM DS) 800-160 mg Tab; Take 1 tablet by mouth 2 (two) times daily.   Dispense: 14 tablet; Refill: 0  - triamcinolone acetonide 0.1% (KENALOG) 0.1 % cream; Apply topically 3 (three) times daily.  Dispense: 30 g; Refill: 2  Seems consistent with bacterial infection superimposed on insect bite.  Will give antibiotic and steroid cream.  Pt will send picture in tomorrow.      Praveen Jeffery MD  [facility]                Answers for HPI/ROS submitted by the patient on 5/7/2020   activity change: No  unexpected weight change: No  rhinorrhea: No  trouble swallowing: No  visual disturbance: No  chest tightness: No  polyuria: No  difficulty urinating: No  menstrual problem: No  joint swelling: No  arthralgias: No  confusion: No  dysphoric mood: No     [Family Member] : family member [FreeTextEntry3] : Dr. George [FreeTextEntry1] : \par

## 2023-07-19 NOTE — DISCUSSION/SUMMARY
[FreeTextEntry1] : ALOK MENDIETA  is a 83 year F  who presents today July 19, 2023 with the above history and the following active issues. \par \par Dyspnea on exertion.  Nonrheumatic mitral regurgitation and mitral stenosis. .  No signs of CHF.  Though very limited functional status.  Echocardiogram from March reviewed.  Continue to follow.  Smoking cessation discussed.\par \par Carotid atherosclerosis: Continue Plavix and Pravastatin as long as no contraindication after colonoscopy.  Smoking cessation strongly recommended.\par Follow carotid Doppler study and abdominal aortic ultrasound.  History of smoking.  Age.  Diffuse atherosclerotic vascular disease\par \par Evidence of PAD.  Follow-up with DANIKA/PVR study.  Continue antiplatelet agent and statin therapy.  Smoking cessation.  Walking with a cane as much as possible.\par \par HTN:   Continue present regimen medication.  Goal less than 130/80.  Smoking cessation.  No evidence of CHF.  Continue nonpharmacological lifestyle modifications.\par \par HLD: Significant myalgia on pravastatin.  Tolerating rosuvastatin well.  Labs ordered.  LDL goal less than 70\par \par Diabetes: Ongoing f/u with PCP.  Very well controlled hemoglobin A1c in the past.\par \par Factor V Leiden/Colon Ca: Ongoing f/u with heme/onc Dr. Mcfadden.  Continue anticoagulation as per hematologist may need to revisit it based on colonoscopy.  At present being held\par \par \par Counseling regarding low saturated fat, salt and carbohydrate intake was reviewed. Active lifestyle and regular. Exercise along with weight management is advised.\par All the above were at length reviewed. Answered all the questions. Thank you very much for this kind referral. Please do not hesitate to give me a call for any question.\par Part of this transcription was done with voice recognition software and phonetically similar errors are common. I apologize for that. Please do not hesitate to call for any questions due to above.\par \par Sincerely,\par Newton Manning MD,FACC,ADRIAN\par

## 2023-07-19 NOTE — CARDIOLOGY SUMMARY
[de-identified] : September 28, 2021.  Normal sinus rhythm.\par July 5, 2023.  Normal sinus rhythm incomplete right bundle branch block [de-identified] : Nuclear marker perfusion scan November 23, 2021.  Pharmacological.  Breast attenuation and diaphragmatic attenuation.  Small apical inferior apical lateral fixed defect suggestive of scar.  LV ejection fraction 57%. [de-identified] : October 15, 2021.  LV ejection fraction 50%.  Mild mitral stenosis noted.\par \par Echo 3/8/2023 EF 50%, mild LV diastolic dysfunction, trace AR, MALA [de-identified] : CT neck 3/2023 bilateral atherosclerosis [de-identified] : Bilateral carotid Doppler study.  October 15, 2021.  Bilateral 50 to 69% stenosis.  Moderate atherosclerosis.  Progressive worsening noted compared to last year.\par \par Carotid US 3/8/2023 progression of atherosclerosis

## 2023-07-19 NOTE — PHYSICAL EXAM
[Well Nourished] : well nourished [No Acute Distress] : no acute distress [Frail] : frail [Normal Venous Pressure] : normal venous pressure [Normal S1, S2] : normal S1, S2 [No Rub] : no rub [No Gallop] : no gallop [No Respiratory Distress] : no respiratory distress  [Wheeze ____] : wheeze [unfilled] [Soft] : abdomen soft [Non Tender] : non-tender [No Masses/organomegaly] : no masses/organomegaly [Normal Bowel Sounds] : normal bowel sounds [Normal Gait] : normal gait [No Edema] : no edema [No Cyanosis] : no cyanosis [No Clubbing] : no clubbing [No Rash] : no rash [No Skin Lesions] : no skin lesions [Moves all extremities] : moves all extremities [No Focal Deficits] : no focal deficits [Normal Speech] : normal speech [Alert and Oriented] : alert and oriented [Normal memory] : normal memory [Normal Radial B/L] : normal radial B/L [de-identified] : Decreased carotid upstroke [de-identified] : Systolic ejection murmur 2/6 [de-identified] : Diminished left lower extremity distal pulses.

## 2023-10-12 ENCOUNTER — APPOINTMENT (OUTPATIENT)
Dept: CARDIOLOGY | Facility: CLINIC | Age: 83
End: 2023-10-12
Payer: MEDICARE

## 2023-10-12 PROCEDURE — 93979 VASCULAR STUDY: CPT

## 2023-10-12 PROCEDURE — 93306 TTE W/DOPPLER COMPLETE: CPT

## 2023-10-12 PROCEDURE — 93880 EXTRACRANIAL BILAT STUDY: CPT

## 2023-10-17 ENCOUNTER — APPOINTMENT (OUTPATIENT)
Dept: CARDIOLOGY | Facility: CLINIC | Age: 83
End: 2023-10-17
Payer: MEDICARE

## 2023-10-17 VITALS
BODY MASS INDEX: 17.66 KG/M2 | HEIGHT: 62 IN | SYSTOLIC BLOOD PRESSURE: 130 MMHG | WEIGHT: 96 LBS | OXYGEN SATURATION: 99 % | HEART RATE: 74 BPM | DIASTOLIC BLOOD PRESSURE: 60 MMHG

## 2023-10-17 DIAGNOSIS — E04.1 NONTOXIC SINGLE THYROID NODULE: ICD-10-CM

## 2023-10-17 PROCEDURE — 93923 UPR/LXTR ART STDY 3+ LVLS: CPT

## 2023-10-17 PROCEDURE — 99406 BEHAV CHNG SMOKING 3-10 MIN: CPT

## 2023-10-17 PROCEDURE — 99215 OFFICE O/P EST HI 40 MIN: CPT | Mod: 25

## 2023-12-11 ENCOUNTER — APPOINTMENT (OUTPATIENT)
Dept: CARDIOLOGY | Facility: CLINIC | Age: 83
End: 2023-12-11
Payer: MEDICARE

## 2023-12-19 NOTE — PHYSICAL EXAM
[General Appearance - Well Developed] : well developed [Normal Appearance] : normal appearance [Well Groomed] : well groomed [General Appearance - Well Nourished] : well nourished [No Deformities] : no deformities [General Appearance - In No Acute Distress] : no acute distress [Normal Conjunctiva] : the conjunctiva exhibited no abnormalities [Eyelids - No Xanthelasma] : the eyelids demonstrated no xanthelasmas [No Oral Pallor] : no oral pallor [Normal Oral Mucosa] : normal oral mucosa [No Oral Cyanosis] : no oral cyanosis [Normal Jugular Venous V Waves Present] : normal jugular venous V waves present [Normal Jugular Venous A Waves Present] : normal jugular venous A waves present [No Jugular Venous Guerrero A Waves] : no jugular venous guerrero A waves [Respiration, Rhythm And Depth] : normal respiratory rhythm and effort [Auscultation Breath Sounds / Voice Sounds] : lungs were clear to auscultation bilaterally [Exaggerated Use Of Accessory Muscles For Inspiration] : no accessory muscle use [Heart Rate And Rhythm] : heart rate and rhythm were normal [Heart Sounds] : normal S1 and S2 [Murmurs] : no murmurs present [Abdomen Soft] : soft [Abdomen Tenderness] : non-tender [Abdomen Mass (___ Cm)] : no abdominal mass palpated [Abnormal Walk] : normal gait [Gait - Sufficient For Exercise Testing] : the gait was sufficient for exercise testing [Nail Clubbing] : no clubbing of the fingernails [Cyanosis, Localized] : no localized cyanosis [Petechial Hemorrhages (___cm)] : no petechial hemorrhages [Skin Color & Pigmentation] : normal skin color and pigmentation [] : no rash [No Venous Stasis] : no venous stasis [Skin Lesions] : no skin lesions [No Skin Ulcers] : no skin ulcer [No Xanthoma] : no  xanthoma was observed [Oriented To Time, Place, And Person] : oriented to person, place, and time [Affect] : the affect was normal [Mood] : the mood was normal [No Anxiety] : not feeling anxious Spontaneous, unlabored and symmetrical

## 2024-01-06 PROBLEM — D68.51 FACTOR V LEIDEN: Status: ACTIVE | Noted: 2017-12-13

## 2024-01-06 PROBLEM — I70.1 RENAL ARTERY STENOSIS: Status: ACTIVE | Noted: 2017-12-13

## 2024-01-06 PROBLEM — M79.10 MYALGIA: Status: ACTIVE | Noted: 2021-11-30

## 2024-01-08 ENCOUNTER — NON-APPOINTMENT (OUTPATIENT)
Age: 84
End: 2024-01-08

## 2024-01-08 ENCOUNTER — APPOINTMENT (OUTPATIENT)
Dept: CARDIOLOGY | Facility: CLINIC | Age: 84
End: 2024-01-08
Payer: MEDICARE

## 2024-01-08 DIAGNOSIS — D68.51 ACTIVATED PROTEIN C RESISTANCE: ICD-10-CM

## 2024-01-08 DIAGNOSIS — I70.1 ATHEROSCLEROSIS OF RENAL ARTERY: ICD-10-CM

## 2024-01-08 DIAGNOSIS — M79.10 MYALGIA, UNSPECIFIED SITE: ICD-10-CM

## 2024-01-08 PROCEDURE — 99215 OFFICE O/P EST HI 40 MIN: CPT

## 2024-01-08 RX ORDER — BUDESONIDE, GLYCOPYRROLATE, AND FORMOTEROL FUMARATE 160; 9; 4.8 UG/1; UG/1; UG/1
160-9-4.8 AEROSOL, METERED RESPIRATORY (INHALATION) AS DIRECTED
Refills: 0 | Status: ACTIVE | COMMUNITY

## 2024-01-08 NOTE — HISTORY OF PRESENT ILLNESS
[FreeTextEntry1] : 83 year old female here for PAD evaluation, sees Dr. Newton Manning and Dr. Franchesca George.  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 Mcfadden). IVC filter.   1/2024 initial vascular visit: very limited by L>R lumbosacral radiculopathy. she does not have exertional claudication (limited prior). no wounds or ulcers. no alarm signs/symptoms. she walks with a cane. current tobacco use.   Her bilateral ABIs are decreased 0.76 R and 0.66 L.   2+ femoral pulses. warm extremities 1+ pulses distal. L>R carotid bruit  On carotid duplex imaging, she has moderate left sided elevation with low EDV which appears fairly stable. Although her CTA notes significant b/l stenosis, there is presently no concrete indication for intervention given low EDV and asymptomatic.   She is tolerating statin therapy. Lipids optimized 5/2023.  She is presently on plavix/dose adjusted eliquis.

## 2024-01-08 NOTE — DISCUSSION/SUMMARY
[FreeTextEntry1] : 83 year old female here for PAD evaluation, sees Dr. Newton Manning and Dr. Franchesca George.  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 Mcfadden). IVC filter.   She has lower extremity PAD but very limited by L>R lumbosacral radiculopathy. I don't think we should pursue any further evaluation of her LE PAD given absence of perfusion issue. Maintain optimal medical therapy per primary cardiologist. I do think she should further evaluate any other options for her neurologic/back condition.  Regarding her carotid disease, she has fairly low risk findings on duplex evaluation although CTA notes significant bilateral stenosis. She has a low EDV which appears fairly stable. We will follow duplex imaging primarily for EDV in 6 months (last 10/2023). There is presently no concrete indication for intervention. SHe would be a very high risk for a surgery.   Follow in 6 months with carotid doppler same day. ER precautions given to patient.

## 2024-01-11 ENCOUNTER — NON-APPOINTMENT (OUTPATIENT)
Age: 84
End: 2024-01-11

## 2024-04-03 ENCOUNTER — APPOINTMENT (OUTPATIENT)
Dept: CARDIOLOGY | Facility: CLINIC | Age: 84
End: 2024-04-03
Payer: MEDICARE

## 2024-04-03 VITALS
SYSTOLIC BLOOD PRESSURE: 120 MMHG | HEIGHT: 62 IN | HEART RATE: 84 BPM | BODY MASS INDEX: 17.3 KG/M2 | WEIGHT: 94 LBS | DIASTOLIC BLOOD PRESSURE: 60 MMHG | OXYGEN SATURATION: 97 %

## 2024-04-03 DIAGNOSIS — I25.10 ATHEROSCLEROTIC HEART DISEASE OF NATIVE CORONARY ARTERY W/OUT ANGINA PECTORIS: ICD-10-CM

## 2024-04-03 DIAGNOSIS — E78.5 HYPERLIPIDEMIA, UNSPECIFIED: ICD-10-CM

## 2024-04-03 DIAGNOSIS — F17.219 NICOTINE DEPENDENCE, CIGARETTES, WITH UNSPECIFIED NICOTINE-INDUCED DISORDERS: ICD-10-CM

## 2024-04-03 DIAGNOSIS — I65.23 OCCLUSION AND STENOSIS OF BILATERAL CAROTID ARTERIES: ICD-10-CM

## 2024-04-03 DIAGNOSIS — I77.9 DISORDER OF ARTERIES AND ARTERIOLES, UNSPECIFIED: ICD-10-CM

## 2024-04-03 DIAGNOSIS — I10 ESSENTIAL (PRIMARY) HYPERTENSION: ICD-10-CM

## 2024-04-03 DIAGNOSIS — R06.09 OTHER FORMS OF DYSPNEA: ICD-10-CM

## 2024-04-03 PROCEDURE — G2211 COMPLEX E/M VISIT ADD ON: CPT

## 2024-04-03 PROCEDURE — 99406 BEHAV CHNG SMOKING 3-10 MIN: CPT

## 2024-04-03 PROCEDURE — 99214 OFFICE O/P EST MOD 30 MIN: CPT | Mod: 25

## 2024-04-03 RX ORDER — ATORVASTATIN CALCIUM 20 MG/1
20 TABLET, FILM COATED ORAL DAILY
Qty: 90 | Refills: 3 | Status: ACTIVE | COMMUNITY
Start: 2024-04-03 | End: 1900-01-01

## 2024-04-03 RX ORDER — HYDRALAZINE HYDROCHLORIDE 50 MG/1
50 TABLET ORAL
Qty: 180 | Refills: 3 | Status: ACTIVE | COMMUNITY
Start: 2020-07-16 | End: 1900-01-01

## 2024-04-03 RX ORDER — ROSUVASTATIN CALCIUM 20 MG/1
20 TABLET, FILM COATED ORAL DAILY
Qty: 90 | Refills: 3 | Status: DISCONTINUED | COMMUNITY
Start: 2021-11-30 | End: 2024-04-03

## 2024-04-03 RX ORDER — CLOPIDOGREL BISULFATE 75 MG/1
75 TABLET, FILM COATED ORAL DAILY
Qty: 90 | Refills: 3 | Status: ACTIVE | COMMUNITY
Start: 1900-01-01 | End: 1900-01-01

## 2024-04-03 RX ORDER — ALBUTEROL SULFATE 2.5 MG/3ML
(2.5 MG/3ML) SOLUTION RESPIRATORY (INHALATION)
Refills: 0 | Status: ACTIVE | COMMUNITY

## 2024-04-03 NOTE — PHYSICAL EXAM
[Well Nourished] : well nourished [No Acute Distress] : no acute distress [Frail] : frail [Normal Venous Pressure] : normal venous pressure [Normal S1, S2] : normal S1, S2 [No Rub] : no rub [No Respiratory Distress] : no respiratory distress  [Wheeze ____] : wheeze [unfilled] [No Gallop] : no gallop [Soft] : abdomen soft [Non Tender] : non-tender [No Masses/organomegaly] : no masses/organomegaly [Normal Bowel Sounds] : normal bowel sounds [Normal Gait] : normal gait [No Edema] : no edema [No Cyanosis] : no cyanosis [No Clubbing] : no clubbing [Moves all extremities] : moves all extremities [Normal Radial B/L] : normal radial B/L [Normal Speech] : normal speech [Alert and Oriented] : alert and oriented [de-identified] : Decreased carotid upstroke [de-identified] : Systolic ejection murmur 2/6 [de-identified] : walks with a acne  [de-identified] : Diminished left lower extremity distal pulses.

## 2024-04-03 NOTE — CARDIOLOGY SUMMARY
[de-identified] : September 28, 2021.  Normal sinus rhythm.\par  July 5, 2023.  Normal sinus rhythm incomplete right bundle branch block [de-identified] : Nuclear myocardial perfusion scan November 23, 2021.  Pharmacological.  Breast attenuation and diaphragmatic attenuation.  Small apical inferior apical lateral fixed defect suggestive of scar.  LV ejection fraction 57%. [de-identified] : October 15, 2021.  LV ejection fraction 50%.  Mild mitral stenosis noted. Echo 3/8/2023 EF 50%, mild LV diastolic dysfunction, trace AR, MALA Echocardiogram October 12, 2023 EF 50% mild MR/MS.  Trace AR. [de-identified] : CT neck 3/2023 bilateral atherosclerosis up to 80% bilateral proximal ICA.  Left thyroid nodule.  8 mm. [de-identified] : Bilateral carotid Doppler study.  October 15, 2021.  Bilateral 50 to 69% stenosis.  Moderate atherosclerosis.  Progressive worsening noted compared to last year. October 12, 2023.  Left ICA 50 to 69%. October 12, 2023.  Abdominal aortic ultrasound.  No aneurysm.  Atherosclerosis with calcification noted. Carotid US 3/8/2023 progression of atherosclerosis   DANIKA/PVR study.  October 17, 2023.  Mild diffuse disease in lower extremities   Proximally and moderate disease below the knee.

## 2024-04-03 NOTE — REASON FOR VISIT
[Hyperlipidemia] : hyperlipidemia [Coronary Artery Disease] : coronary artery disease [Hypertension] : hypertension [Carotid, Aortic and Peripheral Vascular Disease] : carotid, aortic and peripheral vascular disease [Family Member] : family member [FreeTextEntry3] : Dr. George

## 2024-04-03 NOTE — COUNSELING
[Yes] : Risk of tobacco use and health benefits of smoking cessation discussed: Yes [Cessation strategies including cessation program discussed] : Cessation strategies including cessation program discussed [Use of nicotine replacement therapies and other medications discussed] : Use of nicotine replacement therapies and other medications discussed [Encouraged to pick a quit date and identify support needed to quit] : Encouraged to pick a quit date and identify support needed to quit [No] : Not willing to quit smoking [Smoking Cessation Program Referral] : Smoking Cessation Program Referral  [FreeTextEntry2] : She is not ready to quit [FreeTextEntry1] : 3

## 2024-04-03 NOTE — ASSESSMENT
[FreeTextEntry1] :  reviewed on October 17, 2023. DANIKA/PVR study done today reviewed. Labs from May 8, 2023 reviewed.  Significant anemia.  Stable CMP.  LDL cholesterol 44.  Reviewed on April 3, 2024. Labs from February 2024 were reviewed.  Lipid panel not available for me to review.

## 2024-04-03 NOTE — HISTORY OF PRESENT ILLNESS
[FreeTextEntry1] : ALOK MENDIETA  is a 83 year F  who presents today .  Overall she has been feeling well. There has been no recent illness or hospital stay. Medications have remained unchanged. Asymptomatic from cardiovascular and arrhythmia standpoint.   She stopped taking atorvastatin because of significant leg cramps.  She tried a half dose.  Did not work. She no longer needs any chemotherapy. She has continued smoking though according to her she has cut down significantly. Stable VICTOR recently worse without any associated PND orthopnea or pedal edema. Walks with a cane   Continued symptoms related to PAD and lumbosacral radiculopathy.  Today she denies chest pain, pressure,  lightheadedness, dizziness, near syncope or syncope.    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 Mcfadden).

## 2024-04-03 NOTE — DISCUSSION/SUMMARY
[FreeTextEntry1] : ALOK MENDIETA  is a 83 year F  who presents today  with the above history and the following active issues.   Dyspnea on exertion.  Nonrheumatic mitral regurgitation and mitral stenosis. .  No signs of CHF.  Though very limited functional status.  Follow-up with pulmonologist as planned.  Continue inhaler therapy.  Clinically no signs of significant volume overload.  We will check BNP level along with echocardiogram again to rule out any significant cardiovascular etiology..  Continue to follow.  Smoking cessation discussed.  Anemia management.  Carotid atherosclerosis: Continue Plavix .  Discussed statin therapy again. Smoking cessation strongly recommended.  History of smoking.  Age.  Diffuse atherosclerotic vascular disease  Evidence of PAD.  Mild to moderate lower extremity bilateral disease.  Appears to be diffuse.  Continue antiplatelet agent/NOAC low-dose and statin therapy.  Smoking cessation.  Walking with a cane as much as possible.  Follow-up with vascular physician for further recommendations.  HTN:   Continue present regimen medication.  Goal less than 130/80.  Smoking cessation.  No evidence of CHF.  Continue nonpharmacological lifestyle modifications.  HLD: Significant myalgia on pravastatin and now rosuvastatin.  will try atorvastatin 20 mg a day.  If unable to Tolerated or achieve the goal consider discussion regarding PCSK9 inhibitors specifically in presence of significant diffuse multivessel atherosclerotic vascular disease..  Labs ordered.   Diabetes: Ongoing f/u with PCP.  Very well controlled hemoglobin A1c in the past.  Factor V Leiden/Colon Ca: Ongoing f/u with heme/onc Dr. Mcfadden.  Continue anticoagulation as per hematologist may need to revisit it based on colonoscopy.  At present being held  Counseling regarding low saturated fat, salt and carbohydrate intake was reviewed. Active lifestyle and regular. Exercise along with weight management is advised. All the above were at length reviewed. Answered all the questions. Thank you very much for this kind referral. Please do not hesitate to give me a call for any question. Part of this transcription was done with voice recognition software and phonetically similar errors are common. I apologize for that. Please do not hesitate to call for any questions due to above.  Sincerely, Newton Manning MD,FACC,ADRIAN

## 2024-04-03 NOTE — REVIEW OF SYSTEMS
[Dyspnea on exertion] : dyspnea during exertion [SOB] : shortness of breath [Leg Claudication] : intermittent leg claudication [Wheezing] : wheezing [Joint Pain] : joint pain [Joint Stiffness] : joint stiffness [Myalgia] : myalgia [Negative] : Heme/Lymph [Chest Discomfort] : no chest discomfort [Lower Ext Edema] : no extremity edema [Palpitations] : no palpitations [Orthopnea] : no orthopnea [PND] : no PND [Syncope] : no syncope [Cough] : no cough [Coughing Up Blood] : no hemoptysis [Snoring] : no snoring

## 2024-07-08 ENCOUNTER — APPOINTMENT (OUTPATIENT)
Dept: CARDIOLOGY | Facility: CLINIC | Age: 84
End: 2024-07-08

## 2024-10-08 ENCOUNTER — APPOINTMENT (OUTPATIENT)
Dept: CARDIOLOGY | Facility: CLINIC | Age: 84
End: 2024-10-08